# Patient Record
Sex: FEMALE | NOT HISPANIC OR LATINO | Employment: UNEMPLOYED | URBAN - METROPOLITAN AREA
[De-identification: names, ages, dates, MRNs, and addresses within clinical notes are randomized per-mention and may not be internally consistent; named-entity substitution may affect disease eponyms.]

---

## 2017-03-09 ENCOUNTER — OFFICE VISIT (OUTPATIENT)
Dept: NEUROLOGY | Facility: CLINIC | Age: 22
End: 2017-03-09

## 2017-03-09 VITALS — HEART RATE: 73 BPM | DIASTOLIC BLOOD PRESSURE: 75 MMHG | SYSTOLIC BLOOD PRESSURE: 112 MMHG | RESPIRATION RATE: 18 BRPM

## 2017-03-09 DIAGNOSIS — G43.009 MIGRAINE WITHOUT AURA AND WITHOUT STATUS MIGRAINOSUS, NOT INTRACTABLE: Primary | ICD-10-CM

## 2017-03-09 RX ORDER — TOPIRAMATE 25 MG/1
TABLET, FILM COATED ORAL
Qty: 90 TABLET | Refills: 1 | Status: SHIPPED | OUTPATIENT
Start: 2017-03-09 | End: 2017-04-13 | Stop reason: SINTOL

## 2017-03-09 ASSESSMENT — PAIN SCALES - GENERAL: PAINLEVEL: SEVERE PAIN (7)

## 2017-03-09 NOTE — MR AVS SNAPSHOT
After Visit Summary   3/9/2017    Jessica Dutta    MRN: [id number]           Patient Information     Date Of Birth          1995        Visit Information        Provider Department      3/9/2017 2:30 PM Mckay Arellano MD [facility]        Today's Diagnoses     Migraine without aura and without status migrainosus, not intractable    -  1       Follow-ups after your visit        Follow-up notes from your care team     Discussed this visit Return in about 5 weeks (around 2017).      Future tests that were ordered for you today     Open Future Orders        Priority Expected Expires Ordered    Basic metabolic panel Routine 2017 2017 3/9/2017            Who to contact     Please call your clinic at [phone] to:    Ask questions about your health    Make or cancel appointments    Discuss your medicines    Learn about your test results    Speak to your doctor   If you have compliments or concerns about an experience at your clinic, or if you wish to file a complaint, please contact [facility] Patient Relations at [phone] or email us at [email or web address]         Additional Information About Your Visit        MyChart Information     [employer] is an electronic gateway that provides easy, online access to your medical records. With GameLayers, you can request a clinic appointment, read your test results, renew a prescription or communicate with your care team.     To sign up for [employer] visit the website at [email or web address]   You will be asked to enter the access code listed below, as well as some personal information. Please follow the directions to create your username and password.     Your access code is: [email or web address]  Expires: 5/3/2017  8:22 AM     Your access code will  in 90 days. If you need help or a new code, please contact your [facility] or  call [phone] for assistance.        Care EveryWhere ID     This is your Care EveryWhere ID. This could be used by other organizations to access your [place] medical records  [id number]        Your Vitals Were     Pulse Respirations                73 18           Blood Pressure from Last 3 Encounters:   03/09/17 112/75   04/08/16 117/71    Weight from Last 3 Encounters:   04/08/16 210 lb (95.3 kg) (98 %)*     * Growth percentiles are based on [facility] 2-20 Years data.                 Today's Medication Changes          These changes are accurate as of: 3/9/17  3:14 PM.  If you have any questions, ask your nurse or doctor.               Start taking these medicines.        Dose/Directions    topiramate 25 MG tablet   Commonly known as:  TOPAMAX   Used for:  Migraine without aura and without status migrainosus, not intractable   Started by:  Mckay Arellano MD        One at night x 1 week, then 1 in am and 1 at night x 1 week, then 1 in am and 2 at night   Quantity:  90 tablet   Refills:  1            Where to get your medicines      These medications were sent to [place] [address] [place] [address] [place] [id number]     Phone:  [phone]     topiramate 25 MG tablet                Primary Care Provider Office Phone # Fax #    Castillo W Behrens [phone]       [address] [address]        Thank you!     Thank you for choosing [facility]  for your care. Our goal is always to provide you with excellent care. Hearing back from our patients is one way we can continue to improve our services. Please take a few minutes to complete the written survey that you may receive in the mail after your visit with us. Thank you!             Your Updated Medication List - Protect others around you: Learn how to safely use, store and throw away your medicines at www.disposemymeds.org.           This list is accurate as of: 3/9/17  3:14 PM.  Always use your most recent med list.                   Brand Name Dispense Instructions for use    ALAWAY CHILDRENS ALLERGY OP          Aluminum Chloride 12 % Soln          cycloSPORINE 0.05 % ophthalmic emulsion    RESTASIS     1 drop 2 times daily       dextromethorphan-guaiFENesin  MG per 12 hr tablet    MUCINEX DM     Take 1 tablet by mouth every 12 hours       levonorgestrel-ethinyl estradiol 0.15-0.03 MG per tablet    SEASONALE     Take 1 tablet by mouth daily       loteprednol 0.2 % Susp ophthalmic susp    LOTEMAX/ALREX     1 drop 4 times daily       magnesium citrate solution      Take 148 mLs by mouth once       NASACORT ALLERGY 24HR NA          OMEGA-3 FISH OIL PO          PREVACID PO          PROBIOTIC DAILY PO          RANITIDINE HCL PO          topiramate 25 MG tablet    TOPAMAX    90 tablet    One at night x 1 week, then 1 in am and 1 at night x 1 week, then 1 in am and 2 at night       VITAMIN D (CHOLECALCIFEROL) PO      Take by mouth daily       ZYRTEC ALLERGY PO

## 2017-03-09 NOTE — PROGRESS NOTES
2017      Ryan W. Behrens, MD   [facility]   [address] [place]   [address]      RE: Jessica Dutta   MRN: [id number]   : 1995      Dear Castillo:      I saw Jessica Dutta on 2017.  She is a 21-year-old female here for evaluation of headaches.      She began experiencing headaches around puberty.  As time has gone on, she has developed a daily headache pattern.  She has a less severe headache that she describes as a throbbing, aching pain and about twice a week a more severe intense pressure headache.  The more intense headaches are associated with photophobia, nausea and rarely vomiting.  Headaches can be posterior, temporal or frontal.  She denies any complex neurologic symptoms or neurologic aura prior to her headaches but with her more severe ones she will have a sense of feeling fatigued and run down before they developed.      She will take Aleve a few times a week for the headaches.      She believes she has been tried on either amitriptyline, nortriptyline, or both in the past.  She did not tolerate the anticholinergic side effects of these 2 drugs.  It produced dry eyes and dry mouth.      She did have a head CAT scan done on 2016 that I was able to review and it is a normal study.      Her past medical history is notable for reflux.  She also has a prior history of depression.  Other health problems include dysmenorrhea, anxiety, and irritable bowel syndrome.      CURRENT MEDICATIONS:   1.  Magnesium.   2.  Ranitidine.     3.  Prevacid.   4.  Fish oil.   5.  Vitamin D.   6.  Probiotic.     7.  Seasonale.     8.  Restasis eye drops.   9.  Zyrtec, which she indicates she is no longer taking.   10.  Lotemax suspension.      ALLERGIES:  She is allergic to ciprofloxacin, metronidazole, neomycin and Zofran.      Family history is notable for her mother having multiple sclerosis.  There is no family history of migraine that she or her mother is aware of.      She is  currently not working.  She does not smoke or use alcohol.      Examination reveals a quiet female.  Heart rate 73.  Blood pressure 112/75.  Funduscopic examination reveals sharp disc margins.  Pupils are equal, round and react well to light.  Visual fields are intact.  Cranial nerves II-XII are intact.  Motor, sensory, cerebellar and gait testing are normal.  Reflexes are 2-3+ and symmetric.  Plantar responses are flexor.      IMPRESSION:  Chronic headaches.      I believe her underlying problem is migraine.      PLAN:  I discussed the diagnosis with the patient and her mother.  She is having daily headaches of various severity.  I have suggested considering a preventive medication.      As noted above, she had side effects from amitriptyline and nortriptyline, mainly due to their anticholinergic properties.      I discussed a trial of Topamax.  I reviewed potential side effects.  She does not have any absolute contraindications to the drug.      She is going to start on Topamax 25 mg a day for a week, then she can go to 50 mg a day and then 75 mg depending on her response and tolerance.      I am going to have her followup with me in a month or so.  Prior to that visit, I have asked her to have a basic metabolic panel checked.      Sincerely,        MD BRITTNI Chambers MD             D: 2017 15:28   T: 2017 16:35   MT: AKA      Name:     PABLO NATARAJAN   MRN:      [id number]        Account:      [id number]   :      1995           Service Date: 2017      Document: [id number]

## 2017-03-09 NOTE — LETTER
3/9/2017       RE: Jessica Dutta  [address] [place] [address]     Dear Colleague,    Thank you for referring your patient, Jessica Dutta, to the [facility] at [facility]. Please see a copy of my visit note below.    2017      Ryan W. Behrens, MD   [facility]   [address] [place]   [address]      RE: Jessica Dutta   MRN: [id number]   : 1995      Dear Castillo:      I saw Jessica Dutta on 2017.  She is a 21-year-old female here for evaluation of headaches.      She began experiencing headaches around puberty.  As time has gone on, she has developed a daily headache pattern.  She has a less severe headache that she describes as a throbbing, aching pain and about twice a week a more severe intense pressure headache.  The more intense headaches are associated with photophobia, nausea and rarely vomiting.  Headaches can be posterior, temporal or frontal.  She denies any complex neurologic symptoms or neurologic aura prior to her headaches but with her more severe ones she will have a sense of feeling fatigued and run down before they developed.      She will take Aleve a few times a week for the headaches.      She believes she has been tried on either amitriptyline, nortriptyline, or both in the past.  She did not tolerate the anticholinergic side effects of these 2 drugs.  It produced dry eyes and dry mouth.      She did have a head CAT scan done on 2016 that I was able to review and it is a normal study.      Her past medical history is notable for reflux.  She also has a prior history of depression.  Other health problems include dysmenorrhea, anxiety, and irritable bowel syndrome.      CURRENT MEDICATIONS:   1.  Magnesium.   2.  Ranitidine.     3.  Prevacid.   4.  Fish oil.   5.  Vitamin D.   6.  Probiotic.     7.  Seasonale.     8.  Restasis eye drops.   9.  Zyrtec, which  she indicates she is no longer taking.   10.  Lotemax suspension.      ALLERGIES:  She is allergic to ciprofloxacin, metronidazole, neomycin and Zofran.      Family history is notable for her mother having multiple sclerosis.  There is no family history of migraine that she or her mother is aware of.      She is currently not working.  She does not smoke or use alcohol.      Examination reveals a quiet female.  Heart rate 73.  Blood pressure 112/75.  Funduscopic examination reveals sharp disc margins.  Pupils are equal, round and react well to light.  Visual fields are intact.  Cranial nerves II-XII are intact.  Motor, sensory, cerebellar and gait testing are normal.  Reflexes are 2-3+ and symmetric.  Plantar responses are flexor.      IMPRESSION:  Chronic headaches.      I believe her underlying problem is migraine.      PLAN:  I discussed the diagnosis with the patient and her mother.  She is having daily headaches of various severity.  I have suggested considering a preventive medication.      As noted above, she had side effects from amitriptyline and nortriptyline, mainly due to their anticholinergic properties.      I discussed a trial of Topamax.  I reviewed potential side effects.  She does not have any absolute contraindications to the drug.      She is going to start on Topamax 25 mg a day for a week, then she can go to 50 mg a day and then 75 mg depending on her response and tolerance.      I am going to have her followup with me in a month or so.  Prior to that visit, I have asked her to have a basic metabolic panel checked.      Sincerely,                BRITTNI WALDEN MD             D: 2017 15:28   T: 2017 16:35   MT: AKA      Name:     PABLO NATARAJAN   MRN:      [id number]        Account:      [id number]   :      1995           Service Date: 2017      Document: [id number]

## 2017-04-04 LAB
ANION GAP SERPL CALCULATED.3IONS-SCNC: 8 MMOL/L
BUN SERPL-MCNC: 8 MG/DL
CALCIUM SERPL-MCNC: 9.5 MG/DL
CHLORIDE SERPLBLD-SCNC: 107 MMOL/L
CO2 SERPL-SCNC: 24 MMOL/L
CREAT SERPL-MCNC: 0.74 MG/DL
GFR SERPL CREATININE-BSD FRML MDRD: NORMAL ML/MIN/1.73M2
GLUCOSE SERPL-MCNC: 92 MG/DL (ref 70–99)
POTASSIUM SERPL-SCNC: 3.9 MMOL/L
SODIUM SERPL-SCNC: 139 MMOL/L

## 2017-04-13 ENCOUNTER — OFFICE VISIT (OUTPATIENT)
Dept: NEUROLOGY | Facility: CLINIC | Age: 22
End: 2017-04-13

## 2017-04-13 VITALS — RESPIRATION RATE: 18 BRPM | SYSTOLIC BLOOD PRESSURE: 118 MMHG | HEART RATE: 86 BPM | DIASTOLIC BLOOD PRESSURE: 75 MMHG

## 2017-04-13 DIAGNOSIS — R51.9 CHRONIC DAILY HEADACHE: Primary | ICD-10-CM

## 2017-04-13 RX ORDER — GABAPENTIN 100 MG/1
CAPSULE ORAL
Qty: 180 CAPSULE | Refills: 3 | Status: SHIPPED | OUTPATIENT
Start: 2017-04-13 | End: 2019-05-15

## 2017-04-13 ASSESSMENT — PAIN SCALES - GENERAL: PAINLEVEL: SEVERE PAIN (6)

## 2017-04-13 NOTE — LETTER
2017       RE: Pablo Dutta  [address] [place] [address]     Dear Colleague,    Thank you for referring your patient, Pablo Dutta, to the [facility] Please see a copy of my visit note below.    2017      Ryan W. Behrens, MD   [facility]    [address] [place] [address]      RE: Pablo Dutta   MRN: [id number]   : 1995      Dear Castillo:      I saw Pablo Dutta back.  She is a patient I saw last month with chronic headaches.  She has been experiencing daily headaches.  She likely has underlying migraine.      She did try Topamax.  She developed intolerable paresthesias and stopped the drug.  She had previously tried amitriptyline and nortriptyline, but both of these produced intolerable anticholinergic side effects.      I discussed a couple of other treatment options for her.  I discussed propranolol, but she is concerned about the potential for it affecting her exercise tolerance.      We discussed gabapentin.  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  She was only on it for a short time and cannot recall if she definitely had any side effects from the drug.  I did review potential side effects including sedation, depression and peripheral edema.      She is going to start on gabapentin 100 mg twice a day for a week, then she can go to 100 mg 3 times a day for a week and then 200 mg 3 times a day.  If she finds the drug excessively sedating, she can take the full dose at bedtime.      I have asked her to call me in a month for an update.  If she is intolerant of gabapentin, she may be interested at that point in trying propranolol.      Sincerely,        BRITTNI WALDEN MD             D: 2017 14:39   T: 2017 21:27   MT: AKA      Name:     PABLO DUTTA   MRN:      [id number]        Account:      [id number]   :      1995            Service Date: 04/13/2017

## 2017-04-13 NOTE — NURSING NOTE
Chief Complaint   Patient presents with     RECHECK     5 wk f/u RX check labs     Darius Gould,CMA

## 2017-04-13 NOTE — MR AVS SNAPSHOT
After Visit Summary   2017    Jessica Dutta    MRN: [id number]           Patient Information     Date Of Birth          1995        Visit Information        Provider Department      2017 2:00 PM Mckay Arellano MD [facility]        Today's Diagnoses     Chronic daily headache    -  1      Care Instructions    Call Dr Arellano in mid May for an update  Call [phone]. Hit option #1        Follow-ups after your visit        Follow-up notes from your care team     Discussed this visit Return in about 6 months (around 10/13/2017).      Who to contact     Please call your clinic at [phone] to:    Ask questions about your health    Make or cancel appointments    Discuss your medicines    Learn about your test results    Speak to your doctor   If you have compliments or concerns about an experience at your clinic, or if you wish to file a complaint, please contact [facility] Patient Relations at [phone] or email us at [email or web address]         Additional Information About Your Visit        MyChart Information     [employer] is an electronic gateway that provides easy, online access to your medical records. With [employer]ovation, you can request a clinic appointment, read your test results, renew a prescription or communicate with your care team.     To sign up for [employer] visit the website at [email or web address]   You will be asked to enter the access code listed below, as well as some personal information. Please follow the directions to create your username and password.     Your access code is: [email or web address]  Expires: 5/3/2017  9:22 AM     Your access code will  in 90 days. If you need help or a new code, please contact your [facility] or call [phone] for assistance.        Care EveryWhere ID     This is your Care EveryWhere ID. This could be used by  other organizations to access your [place] medical records  [id number]        Your Vitals Were     Pulse Respirations                86 18           Blood Pressure from Last 3 Encounters:   04/13/17 118/75   03/09/17 112/75   04/08/16 117/71    Weight from Last 3 Encounters:   04/08/16 210 lb (95.3 kg) (98 %)*     * Growth percentiles are based on [facility] 2-20 Years data.              Today, you had the following     No orders found for display         Today's Medication Changes          These changes are accurate as of: 4/13/17  2:32 PM.  If you have any questions, ask your nurse or doctor.               Start taking these medicines.        Dose/Directions    gabapentin 100 MG capsule   Commonly known as:  NEURONTIN   Used for:  Chronic daily headache   Started by:  Mckay Arellano MD        1 twice a day x 1 week then 1CAP 3 times a day x 1 week then 2 CAPS 3 times a day   Quantity:  180 capsule   Refills:  3         Stop taking these medicines if you haven't already. Please contact your care team if you have questions.     topiramate 25 MG tablet   Commonly known as:  TOPAMAX   Stopped by:  Mckay Arellano MD                Where to get your medicines      These medications were sent to [place] [address] [place] [address] [address]     Phone:  [phone]     gabapentin 100 MG capsule                Primary Care Provider Office Phone # Fax #    Ryan W Behrens [phone]       [facility] [address] [facility] [address]        Thank you!     Thank you for choosing [facility]  for your care. Our goal is always to provide you with excellent care. Hearing back from our patients is one way we can continue to improve our services. Please take a few minutes to complete the written survey that you may receive in the mail after your visit with us. Thank you!             Your Updated  Medication List - Protect others around you: Learn how to safely use, store and throw away your medicines at www.disposemymeds.org.          This list is accurate as of: 4/13/17  2:32 PM.  Always use your most recent med list.                   Brand Name Dispense Instructions for use    ALAWAY CHILDRENS ALLERGY OP          Aluminum Chloride 12 % Soln          CIMETIDINE PO      Take 200 mg by mouth once       cycloSPORINE 0.05 % ophthalmic emulsion    RESTASIS     1 drop 2 times daily       dextromethorphan-guaiFENesin  MG per 12 hr tablet    MUCINEX DM     Take 1 tablet by mouth every 12 hours       gabapentin 100 MG capsule    NEURONTIN    180 capsule    1 twice a day x 1 week then 1CAP 3 times a day x 1 week then 2 CAPS 3 times a day       levonorgestrel-ethinyl estradiol 0.15-0.03 MG per tablet    SEASONALE     Take 1 tablet by mouth daily       loteprednol 0.2 % Susp ophthalmic susp    LOTEMAX/ALREX     1 drop 4 times daily       magnesium citrate solution      Take 148 mLs by mouth once       NASACORT ALLERGY 24HR NA          OMEGA-3 FISH OIL PO          PREVACID PO          PROBIOTIC DAILY PO          RANITIDINE HCL PO          VITAMIN D (CHOLECALCIFEROL) PO      Take by mouth daily       ZYRTEC ALLERGY PO

## 2017-04-14 NOTE — PROGRESS NOTES
2017      Ryan W. Behrens, MD   [facility]    [address] [place] [address]      RE: Pablo Dutta   MRN: [id number]   : 1995      Dear Castillo:      I saw Pablo Dutta back.  She is a patient I saw last month with chronic headaches.  She has been experiencing daily headaches.  She likely has underlying migraine.      She did try Topamax.  She developed intolerable paresthesias and stopped the drug.  She had previously tried amitriptyline and nortriptyline, but both of these produced intolerable anticholinergic side effects.      I discussed a couple of other treatment options for her.  I discussed propranolol, but she is concerned about the potential for it affecting her exercise tolerance.      We discussed gabapentin.  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  She was only on it for a short time and cannot recall if she definitely had any side effects from the drug.  I did review potential side effects including sedation, depression and peripheral edema.      She is going to start on gabapentin 100 mg twice a day for a week, then she can go to 100 mg 3 times a day for a week and then 200 mg 3 times a day.  If she finds the drug excessively sedating, she can take the full dose at bedtime.      I have asked her to call me in a month for an update.  If she is intolerant of gabapentin, she may be interested at that point in trying propranolol.      Sincerely,      MD BRITTNI Chambers MD             D: 2017 14:39   T: 2017 21:27   MT: AKA      Name:     PABLO DUTTA   MRN:      [id number]        Account:      [id number]   :      1995           Service Date: 2017      Document: [id number]

## 2017-04-19 DIAGNOSIS — G43.009 MIGRAINE WITHOUT AURA AND WITHOUT STATUS MIGRAINOSUS, NOT INTRACTABLE: ICD-10-CM

## 2017-11-30 ENCOUNTER — TELEPHONE (OUTPATIENT)
Dept: GASTROENTEROLOGY | Facility: CLINIC | Age: 22
End: 2017-11-30

## 2017-11-30 NOTE — TELEPHONE ENCOUNTER
182 pages of medical records received from [facility] with copy of RAMBO signed by patient requesting that these be sent to Dr. Norris.  There are no noted C Diff diagnoses in records.  Call place to patient to ask her why these were sent to Dr. Norris.  Message left for patient to return my call giving my direct call back number.      Carol Westholter

## 2017-12-13 NOTE — TELEPHONE ENCOUNTER
No call back from patient.  Closing encounter at this time.  Will hold on to records for now.     Carol Westholter

## 2019-05-15 ENCOUNTER — OFFICE VISIT (OUTPATIENT)
Dept: OBGYN | Facility: CLINIC | Age: 24
End: 2019-05-15
Attending: OBSTETRICS & GYNECOLOGY
Payer: COMMERCIAL

## 2019-05-15 VITALS
SYSTOLIC BLOOD PRESSURE: 127 MMHG | HEART RATE: 86 BPM | HEIGHT: 66 IN | DIASTOLIC BLOOD PRESSURE: 81 MMHG | BODY MASS INDEX: 30.82 KG/M2 | WEIGHT: 191.8 LBS

## 2019-05-15 DIAGNOSIS — N94.819 VULVODYNIA: ICD-10-CM

## 2019-05-15 DIAGNOSIS — N94.810 VESTIBULITIS, VULVAR: Primary | ICD-10-CM

## 2019-05-15 PROCEDURE — G0463 HOSPITAL OUTPT CLINIC VISIT: HCPCS | Mod: ZF

## 2019-05-15 RX ORDER — DOXEPIN HYDROCHLORIDE 50 MG/G
CREAM TOPICAL
Qty: 45 G | Refills: 3 | Status: SHIPPED | OUTPATIENT
Start: 2019-05-15

## 2019-05-15 RX ORDER — INDAPAMIDE 1.25 MG
TABLET ORAL
Refills: 11 | COMMUNITY
Start: 2018-09-18

## 2019-05-15 RX ORDER — BETAMETHASONE DIPROPIONATE 0.5 MG/G
CREAM TOPICAL 2 TIMES DAILY
Refills: 6 | COMMUNITY
Start: 2018-11-20 | End: 2019-05-16

## 2019-05-15 RX ORDER — CLINDAMYCIN PHOSPHATE 10 UG/ML
LOTION TOPICAL
COMMUNITY
Start: 2018-09-14

## 2019-05-15 RX ORDER — DOXEPIN HYDROCHLORIDE 50 MG/G
CREAM TOPICAL
Refills: 5 | COMMUNITY
Start: 2018-11-20

## 2019-05-15 RX ORDER — NAPROXEN 500 MG/1
TABLET ORAL
COMMUNITY
Start: 2015-11-27

## 2019-05-15 RX ORDER — FLUOCINOLONE ACETONIDE 0.11 MG/ML
OIL AURICULAR (OTIC)
Refills: 6 | COMMUNITY
Start: 2019-03-19

## 2019-05-15 RX ORDER — SULFACETAMIDE SODIUM 100 MG/ML
LOTION TOPICAL
Refills: 2 | COMMUNITY
Start: 2019-05-09 | End: 2024-04-26

## 2019-05-15 ASSESSMENT — ANXIETY QUESTIONNAIRES
GAD7 TOTAL SCORE: 5
3. WORRYING TOO MUCH ABOUT DIFFERENT THINGS: SEVERAL DAYS
7. FEELING AFRAID AS IF SOMETHING AWFUL MIGHT HAPPEN: NOT AT ALL
1. FEELING NERVOUS, ANXIOUS, OR ON EDGE: SEVERAL DAYS
2. NOT BEING ABLE TO STOP OR CONTROL WORRYING: NOT AT ALL
5. BEING SO RESTLESS THAT IT IS HARD TO SIT STILL: NOT AT ALL
6. BECOMING EASILY ANNOYED OR IRRITABLE: MORE THAN HALF THE DAYS

## 2019-05-15 ASSESSMENT — MIFFLIN-ST. JEOR: SCORE: 1638.81

## 2019-05-15 ASSESSMENT — PATIENT HEALTH QUESTIONNAIRE - PHQ9
SUM OF ALL RESPONSES TO PHQ QUESTIONS 1-9: 5
5. POOR APPETITE OR OVEREATING: SEVERAL DAYS

## 2019-05-15 NOTE — PROGRESS NOTES
Progress Note    SUBJECTIVE:  Jessica Dutta is an 23 year old, , who requests opinion on chronic vulvodynia/vulvar vestibulitis. Reports that at about age 16yo, started having recurrent sinus infections treated with antibiotics, and also subsequent vaginal yeast infections after abx therapy. At around this time, started having chronic vulvar pain and irritation. Mostly burning sensation, has multiple times been tested for BV/yeast and negative.     Previous therapies:  Pelvic floor PT  Oral and topical gabapentin  Oral antidepressants  Topical steroids  Lidocaine gel (2%), which does help but only for immediate relief  Most recently doxepin topically 1-2x/day which also helped, but insurance would not cover    She states that her current OBGYN sent her here to see if topical doxepin would be covered if she was able to see someone more specialized at the [place] [facility] to recommend it, or if there were any other therapies indicated.    She does not recall ever trying topical estrogen or estrogen/testosterone compound topically.     Never sexually active, has never had pap smear before due to difficulty tolerating exams.    Takes COCs due to heavy, crampy cycles, also takes Naproxen which helps with cycle control and pain.     There is a somewhat odd family dynamic, she is accompanied today by her mother and brother (who is about her age, maybe a little bit older). Both did leave room for the exam, and patient denied any history of abuse (physical, emotional or sexual) while she was alone with provider.       MEDICAL HISTORY:    Current Outpatient Medications on File Prior to Visit:  Aluminum Chloride 12 % SOLN    botulinum toxin type A (BOTOX) 100 units injection    Cetirizine HCl (ZYRTEC ALLERGY PO)    clindamycin (CLEOCIN T) 1 % external lotion APPLY THIN LAYER TO FACE, CHEST, BACK,NECK, AND FLANK ONCE DAILY IN THE MORNING   cycloSPORINE (RESTASIS) 0.05 % ophthalmic emulsion 1 drop 2 times daily    dextromethorphan-guaiFENesin (MUCINEX DM)  MG per 12 hr tablet Take 1 tablet by mouth every 12 hours   Lansoprazole (PREVACID PO)    levonorgestrel-ethinyl estradiol (SEASONALE) 0.15-0.03 MG per tablet Take 1 tablet by mouth daily   loteprednol (LOTEMAX/ALREX) 0.2 % SUSP 1 drop 4 times daily   magnesium hydroxide (MILK OF MAGNESIA) 400 MG/5ML suspension    naproxen (NAPROSYN) 500 MG tablet    olopatadine (PAZEO) 0.7 % ophthalmic solution    Omega-3 Fatty Acids (OMEGA-3 FISH OIL PO)    Probiotic Product (PROBIOTIC DAILY PO)    RANITIDINE HCL PO    Triamcinolone Acetonide (NASACORT ALLERGY 24HR NA)    VITAMIN D, CHOLECALCIFEROL, PO Take by mouth daily   doxepin (ZONALON) 5 % external cream APPLY TOPICALLY FOUR TIMES DAILY   FINACEA 15 % external gel APPLY THIN LAYER TO AFFECTED AREA ON FACE, NECK,CHEST, BACK, FLANKS, THIGHS AND BUTTOCKS AT NIGHT   fluocinolone acetonide 0.01 % OIL PLACE 5 DROPS INTO BOTH EARS 2 TIMES DAILY. AS NEEDED   lidocaine (XYLOCAINE) 2 % external gel USE TOPICALLY AS NEEDED   sulfacetamide sodium, Acne, 10 % lotion APPLY TO FACE, NECK, CHEST TWICE DAILY     No current facility-administered medications on file prior to visit.   Allergies   Allergen Reactions     Ciprofloxacin Muscle Pain (Myalgia)     Metronidazole Muscle Pain (Myalgia)     Neomycin Rash     Zofran [Ondansetron] Rash       OB History    Para Term  AB Living   0 0 0 0 0 0   SAB TAB Ectopic Multiple Live Births   0 0 0 0 0     Past Medical History:   Diagnosis Date     Birth control      GERD (gastroesophageal reflux disease)      Seasonal allergies      Past Surgical History:   Procedure Laterality Date     ESOPHAGOSCOPY, GASTROSCOPY, DUODENOSCOPY (EGD), COMBINED N/A 2016    Procedure: COMBINED ESOPHAGOSCOPY, GASTROSCOPY, DUODENOSCOPY (EGD), REMOVE FOREIGN BODY;  Surgeon: Jessica Martinez MD;  Location:  GI     No family history on file.  Social History     Socioeconomic History     Marital  "status: Single     Spouse name: None   Tobacco Use     Smoking status: Never Smoker     Smokeless tobacco: Never Used   Substance and Sexual Activity     Alcohol use: No     Drug use: None     Sexual activity: Never     Birth control/protection: Pill     Intimate partner violence:        ROS: 10 point ROS neg other than the symptoms noted above in the HPI.    PHYSICAL EXAM:  Blood pressure 127/81, pulse 86, height 1.664 m (5' 5.5\"), weight 87 kg (191 lb 12.8 oz), not currently breastfeeding. Body mass index is 31.43 kg/m .  General - Well-nourished, pleasant female in no acute distress  Psych - somewhat flat affect, otherwise appropriate  Neurological - grossly normal strength. Normal mental status    Pelvic Exam:  EG/BUS: Normal genital architecture without lesions, erythema or abnormal secretions Bartholin's, Urethra, [place]'s appear normal   Positive q-tip test 5-7 o'clock of introitus, moderately TTP consistent with vulvar vestibulitis  Internal exam/bimanual exam not done due to patient discomfort      ASSESSMENT:  Encounter Diagnoses   Name Primary?     Vestibulitis, vulvar Yes     Vulvodynia         PLAN:   Long discussion with patient and her family regarding difficulty in treatment and many possible therapies for vulvar vestibulitis and vulvodynia. Reviewed recommended hygiene practices which she is very aware of and currently following. Also reviewed that first-line therapy is usually hormonal topical treatments (estrogen/testosterone cream or estrogen alone). However, since she has had good success with the doxepin, I think it is reasonable to continue to try to get this pre-authorized. Should this fail, could potentially try hormonal topical therapy, but this may likely not be covered well by insurance, either. She will keep in contact with me regarding coverage and if she would like to try hormonal topical therapy instead.     Also discussed recommendations for using dilator kit (which she has) and " resuming PT once her vulvar vestibulitis is better controlled, so that her vulvodynia can also be addressed with multi-modal therapy and also to be able to perform recommended pap screening which she is due for.    Will plan for 3 month f/u to see how therapy is going if we are able to get back on the doxepin regularly, or if we need to change to hormonal therapy, and will see if can tolerate pelvic exam at that time as well. Given clinic number to call if further issues with insurance and desires to try estrogen/testosterone therapy.    Marie Estrada MD  5/15/19

## 2019-05-15 NOTE — LETTER
5/15/2019       RE: Jessica Dutta  [address] [place] [address]     Dear Colleague,    Thank you for referring your patient, Jessica Dutta, to the [facility] at [facility]. Please see a copy of my visit note below.    Progress Note    SUBJECTIVE:  Jessica Dutta is an 23 year old, , who requests opinion on chronic vulvodynia/vulvar vestibulitis. Reports that at about age 18yo, started having recurrent sinus infections treated with antibiotics, and also subsequent vaginal yeast infections after abx therapy. At around this time, started having chronic vulvar pain and irritation. Mostly burning sensation, has multiple times been tested for BV/yeast and negative.     Previous therapies:  Pelvic floor PT  Oral and topical gabapentin  Oral antidepressants  Topical steroids  Lidocaine gel (2%), which does help but only for immediate relief  Most recently doxepin topically 1-2x/day which also helped, but insurance would not cover    She states that her current OBGYN sent her here to see if topical doxepin would be covered if she was able to see someone more specialized at the [facility] to recommend it, or if there were any other therapies indicated.    She does not recall ever trying topical estrogen or estrogen/testosterone compound topically.     Never sexually active, has never had pap smear before due to difficulty tolerating exams.    Takes COCs due to heavy, crampy cycles, also takes Naproxen which helps with cycle control and pain.     There is a somewhat odd family dynamic, she is accompanied today by her mother and brother (who is about her age, maybe a little bit older). Both did leave room for the exam, and patient denied any history of abuse (physical, emotional or sexual) while she was alone with provider.       MEDICAL HISTORY:    Current Outpatient Medications on File Prior to Visit:  Aluminum Chloride 12 % SOLN    botulinum toxin type  A (BOTOX) 100 units injection    Cetirizine HCl (ZYRTEC ALLERGY PO)    clindamycin (CLEOCIN T) 1 % external lotion APPLY THIN LAYER TO FACE, CHEST, BACK,NECK, AND FLANK ONCE DAILY IN THE MORNING   cycloSPORINE (RESTASIS) 0.05 % ophthalmic emulsion 1 drop 2 times daily   dextromethorphan-guaiFENesin (MUCINEX DM)  MG per 12 hr tablet Take 1 tablet by mouth every 12 hours   Lansoprazole (PREVACID PO)    levonorgestrel-ethinyl estradiol (SEASONALE) 0.15-0.03 MG per tablet Take 1 tablet by mouth daily   loteprednol (LOTEMAX/ALREX) 0.2 % SUSP 1 drop 4 times daily   magnesium hydroxide (MILK OF MAGNESIA) 400 MG/5ML suspension    naproxen (NAPROSYN) 500 MG tablet    olopatadine (PAZEO) 0.7 % ophthalmic solution    Omega-3 Fatty Acids (OMEGA-3 FISH OIL PO)    Probiotic Product (PROBIOTIC DAILY PO)    RANITIDINE HCL PO    Triamcinolone Acetonide (NASACORT ALLERGY 24HR NA)    VITAMIN D, CHOLECALCIFEROL, PO Take by mouth daily   doxepin (ZONALON) 5 % external cream APPLY TOPICALLY FOUR TIMES DAILY   FINACEA 15 % external gel APPLY THIN LAYER TO AFFECTED AREA ON FACE, NECK,CHEST, BACK, FLANKS, THIGHS AND BUTTOCKS AT NIGHT   fluocinolone acetonide 0.01 % OIL PLACE 5 DROPS INTO BOTH EARS 2 TIMES DAILY. AS NEEDED   lidocaine (XYLOCAINE) 2 % external gel USE TOPICALLY AS NEEDED   sulfacetamide sodium, Acne, 10 % lotion APPLY TO FACE, NECK, CHEST TWICE DAILY     No current facility-administered medications on file prior to visit.   Allergies   Allergen Reactions     Ciprofloxacin Muscle Pain (Myalgia)     Metronidazole Muscle Pain (Myalgia)     Neomycin Rash     Zofran [Ondansetron] Rash       OB History    Para Term  AB Living   0 0 0 0 0 0   SAB TAB Ectopic Multiple Live Births   0 0 0 0 0     Past Medical History:   Diagnosis Date     Birth control      GERD (gastroesophageal reflux disease)      Seasonal allergies      Past Surgical History:   Procedure Laterality Date     ESOPHAGOSCOPY, GASTROSCOPY,  "DUODENOSCOPY (EGD), COMBINED N/A 4/8/2016    Procedure: COMBINED ESOPHAGOSCOPY, GASTROSCOPY, DUODENOSCOPY (EGD), REMOVE FOREIGN BODY;  Surgeon: Jessica Martinez MD;  Location: [facility]     No family history on file.  Social History     Socioeconomic History     Marital status: Single     Spouse name: None   Tobacco Use     Smoking status: Never Smoker     Smokeless tobacco: Never Used   Substance and Sexual Activity     Alcohol use: No     Drug use: None     Sexual activity: Never     Birth control/protection: Pill     Intimate partner violence:        ROS: 10 point ROS neg other than the symptoms noted above in the HPI.    PHYSICAL EXAM:  Blood pressure 127/81, pulse 86, height 1.664 m (5' 5.5\"), weight 87 kg (191 lb 12.8 oz), not currently breastfeeding. Body mass index is 31.43 kg/m .  General - Well-nourished, pleasant female in no acute distress  Psych - somewhat flat affect, otherwise appropriate  Neurological - grossly normal strength. Normal mental status    Pelvic Exam:  EG/BUS: Normal genital architecture without lesions, erythema or abnormal secretions Bartholin's, Urethra, [place]'s appear normal   Positive q-tip test 5-7 o'clock of introitus, moderately TTP consistent with vulvar vestibulitis  Internal exam/bimanual exam not done due to patient discomfort      ASSESSMENT:  Encounter Diagnoses   Name Primary?     Vestibulitis, vulvar Yes     Vulvodynia         PLAN:   Long discussion with patient and her family regarding difficulty in treatment and many possible therapies for vulvar vestibulitis and vulvodynia. Reviewed recommended hygiene practices which she is very aware of and currently following. Also reviewed that first-line therapy is usually hormonal topical treatments (estrogen/testosterone cream or estrogen alone). However, since she has had good success with the doxepin, I think it is reasonable to continue to try to get this pre-authorized. Should this fail, could potentially try hormonal " topical therapy, but this may likely not be covered well by insurance, either. She will keep in contact with me regarding coverage and if she would like to try hormonal topical therapy instead.     Also discussed recommendations for using dilator kit (which she has) and resuming PT once her vulvar vestibulitis is better controlled, so that her vulvodynia can also be addressed with multi-modal therapy and also to be able to perform recommended pap screening which she is due for.    Will plan for 3 month f/u to see how therapy is going if we are able to get back on the doxepin regularly, or if we need to change to hormonal therapy, and will see if can tolerate pelvic exam at that time as well. Given clinic number to call if further issues with insurance and desires to try estrogen/testosterone therapy.    Marie Estrada MD  5/15/19

## 2019-05-15 NOTE — PATIENT INSTRUCTIONS
Call our clinic at [phone] if insurance declines prior authorization for doxepin.     Next steps will be estrogen/testosterone cream or estrogen cream alone (topically) daily at bedtime.    Recommend follow-up appointment in 3 months to reassess symptoms, have exam and possible pap smear if tolerated.

## 2019-05-15 NOTE — NURSING NOTE
Chief Complaint   Patient presents with     Consult     Vaginal pain. Insurance is not paying for current meds that was working.       See JAZMÍN Dawkins 5/15/2019

## 2019-05-16 ASSESSMENT — ANXIETY QUESTIONNAIRES: GAD7 TOTAL SCORE: 5

## 2019-05-17 ENCOUNTER — TELEPHONE (OUTPATIENT)
Dept: OBGYN | Facility: CLINIC | Age: 24
End: 2019-05-17

## 2019-05-17 NOTE — LETTER
May 21, 2019    Jessica Dutta  [address] [place] [address]      To Whom It May Concern:      Recently I prescribed doxepin (ZONALON) 5 % external cream for my patient Jessica Dutta to use as long term treatment for Vestibulitis, vulvar [N94.810] and Vulvodynia [N94.819]. This medication's prior authorization was denied. This patient has tried all the alternatives for this product including topical lidocaine, topical and oral gabapentin, oral antidepressants, topical steroids, and pelvic floor physical therapy. The only medication that has been working is the doxepin cream. As is well known in the gynecological community, vulvar vestibulitis and vulvodynia can cause long term psychological, physical, and emotional consequences if not treated. This medication is medically necessary to treat these conditions before they become irreversible. Please reconsider covering this medication for the long term use for Jessica.       Sincerely,        Dr. Marie Estrada

## 2019-05-17 NOTE — TELEPHONE ENCOUNTER
Central Prior Authorization Team   Phone: [phone]      PA Initiation    Medication: doxepin (ZONALON) 5 % external cream-PA Pending  Insurance Company: [employer] - Phone [phone] Fax [phone]  Pharmacy Filling the Rx: [id number] [place] [facility] - [address]  Filling Pharmacy Phone: [phone]  Filling Pharmacy Fax: [phone]  Start Date: 5/17/2019

## 2019-05-17 NOTE — TELEPHONE ENCOUNTER
Prior Authorization Retail Medication Request    Medication/Dose: doxepin (ZONALON) 5 % external cream  ICD code (if different than what is on RX):    Vulvodynia [N94.819]   Vestibulitis, vulvar [N94.810]    Previously Tried and Failed:  Topical lidocaine, Topical and oral gabapentin, oral antidepressant, topical steroids, pelvic floor physical therapy.   Rationale:  This is the only medication that has been helping with symptoms!    Insurance Name:  [employer]  Insurance ID:        Pharmacy Information (if different than what is on RX)  Name:  [facility]  Phone:  [phone]

## 2019-05-20 NOTE — TELEPHONE ENCOUNTER
PRIOR AUTHORIZATION DENIED    Medication: doxepin (ZONALON) 5 % external cream-DENIED    Denial Date: 5/17/2019    Denial Rational:           Appeal Information:

## 2019-05-21 NOTE — TELEPHONE ENCOUNTER
Medication Appeal Initiation    We have initiated an appeal for the requested medication:  Medication: doxepin (ZONALON) 5 % external cream-APPEAL Pending  Appeal Start Date:  5/21/2019  Insurance Company: [employer] - Phone [phone] Fax [phone]  Comments:  Faxed appeal letter along with denial letter to Prescription Claim Appeals [phone]. Call [phone] for status check.

## 2019-05-22 NOTE — TELEPHONE ENCOUNTER
Junior Padilla (independent reviewer) from [employer] called to get clarification if patient will be using medication ongoing daily. Gave him the direction patient is using the medication and that there are refills on it, so yes, patient will be using medication for more than 8 days that insurance will only cover. He will continue with review.

## 2019-05-22 NOTE — TELEPHONE ENCOUNTER
The appeals department called with additional questions in regards to the appeal request; call transferred to PA rakesh Bautista.

## 2019-05-23 ENCOUNTER — TELEPHONE (OUTPATIENT)
Dept: OBGYN | Facility: CLINIC | Age: 24
End: 2019-05-23

## 2019-05-23 DIAGNOSIS — N94.819 VULVODYNIA: ICD-10-CM

## 2019-05-23 DIAGNOSIS — N94.810 VESTIBULITIS, VULVAR: Primary | ICD-10-CM

## 2019-05-23 NOTE — TELEPHONE ENCOUNTER
MEDICATION APPEAL DENIED    Medication: doxepin (ZONALON) 5 % external cream-APPEAL DENIED    Denial Date: 5/22/2019    Denial Rational:           Second Level Appeal Information: See Above (DENIED)

## 2019-05-23 NOTE — TELEPHONE ENCOUNTER
Discussed denial of doxepin cream with Dr. Estrada. OOP cost for doxepin cream at [facility] was over $600. She suggests switching to a compounded estradiol and testosterone cream, to see if this would be less expensive. Pharmacy is going to run it through their system and call back with price and then will call patient.

## 2019-05-23 NOTE — TELEPHONE ENCOUNTER
Discussed with Dr. Estrada, she is going to look into compounded options. Left message for patient that PA and appeal were denied and that we are working on alternatives. Will call patient when we have it worked out.

## 2019-05-24 ENCOUNTER — TELEPHONE (OUTPATIENT)
Dept: OBGYN | Facility: CLINIC | Age: 24
End: 2019-05-24

## 2019-05-24 DIAGNOSIS — N94.810 VESTIBULITIS, VULVAR: Primary | ICD-10-CM

## 2019-05-24 DIAGNOSIS — N94.819 VULVODYNIA: ICD-10-CM

## 2019-05-24 NOTE — TELEPHONE ENCOUNTER
See note.   Spoke with Jessica. She is willing to try compounded medication but wishes to only do estrogen at this time. This is ok per Sean. Rx for compounded estradiol sent to  compounding pharmacy.

## 2019-05-31 ENCOUNTER — TELEPHONE (OUTPATIENT)
Dept: OBGYN | Facility: CLINIC | Age: 24
End: 2019-05-31

## 2019-05-31 NOTE — TELEPHONE ENCOUNTER
Prior Authorization Specialty Medication Request    Medication/Dose:   ICD code (if different than what is on RX):     Previously Tried and Failed:   Important Lab Values:  Rationale:    Insurance Name:   Insurance ID:  Insurance Phone Number:   Pharmacy Information (if different than what is on RX)  Name:  [facility]  Phone:  [phone]

## 2020-03-10 ENCOUNTER — HEALTH MAINTENANCE LETTER (OUTPATIENT)
Age: 25
End: 2020-03-10

## 2020-12-27 ENCOUNTER — HEALTH MAINTENANCE LETTER (OUTPATIENT)
Age: 25
End: 2020-12-27

## 2021-04-24 ENCOUNTER — HEALTH MAINTENANCE LETTER (OUTPATIENT)
Age: 26
End: 2021-04-24

## 2021-10-09 ENCOUNTER — HEALTH MAINTENANCE LETTER (OUTPATIENT)
Age: 26
End: 2021-10-09

## 2022-03-24 ENCOUNTER — TRANSFERRED RECORDS (OUTPATIENT)
Dept: HEALTH INFORMATION MANAGEMENT | Facility: CLINIC | Age: 27
End: 2022-03-24

## 2022-05-16 ENCOUNTER — HEALTH MAINTENANCE LETTER (OUTPATIENT)
Age: 27
End: 2022-05-16

## 2022-09-11 ENCOUNTER — HEALTH MAINTENANCE LETTER (OUTPATIENT)
Age: 27
End: 2022-09-11

## 2023-06-03 ENCOUNTER — HEALTH MAINTENANCE LETTER (OUTPATIENT)
Age: 28
End: 2023-06-03

## 2023-06-15 ENCOUNTER — TRANSFERRED RECORDS (OUTPATIENT)
Dept: HEALTH INFORMATION MANAGEMENT | Facility: CLINIC | Age: 28
End: 2023-06-15

## 2024-04-26 ENCOUNTER — OFFICE VISIT (OUTPATIENT)
Dept: DERMATOLOGY | Facility: CLINIC | Age: 29
End: 2024-04-26
Payer: COMMERCIAL

## 2024-04-26 DIAGNOSIS — L74.519 FOCAL HYPERHIDROSIS: ICD-10-CM

## 2024-04-26 DIAGNOSIS — L81.0 POST-INFLAMMATORY HYPERPIGMENTATION: ICD-10-CM

## 2024-04-26 DIAGNOSIS — L30.9 DERMATITIS: ICD-10-CM

## 2024-04-26 DIAGNOSIS — L30.4 INTERTRIGO: ICD-10-CM

## 2024-04-26 DIAGNOSIS — L70.0 ACNE VULGARIS: Primary | ICD-10-CM

## 2024-04-26 DIAGNOSIS — B35.3 TINEA PEDIS OF BOTH FEET: ICD-10-CM

## 2024-04-26 PROCEDURE — 99204 OFFICE O/P NEW MOD 45 MIN: CPT | Performed by: PHYSICIAN ASSISTANT

## 2024-04-26 RX ORDER — TRIAMCINOLONE ACETONIDE 1 MG/G
CREAM TOPICAL
Qty: 80 G | Refills: 2 | Status: SHIPPED | OUTPATIENT
Start: 2024-04-26

## 2024-04-26 RX ORDER — SULFACETAMIDE SODIUM 100 MG/ML
LOTION TOPICAL
Qty: 118 ML | Refills: 11 | Status: SHIPPED | OUTPATIENT
Start: 2024-04-26

## 2024-04-26 RX ORDER — DESONIDE 0.5 MG/G
CREAM TOPICAL
Qty: 60 G | Refills: 1 | Status: SHIPPED | OUTPATIENT
Start: 2024-04-26

## 2024-04-26 RX ORDER — KETOCONAZOLE 20 MG/G
CREAM TOPICAL
Qty: 60 G | Refills: 5 | Status: SHIPPED | OUTPATIENT
Start: 2024-04-26 | End: 2024-09-19

## 2024-04-26 ASSESSMENT — PAIN SCALES - GENERAL: PAINLEVEL: NO PAIN (0)

## 2024-04-26 NOTE — LETTER
4/26/2024         RE: Jessica Dutta  [address] [place] [address]        Dear Colleague,    Thank you for referring your patient, Jesisca Dutta, to the [facility]. Please see a copy of my visit note below.    HPI:   Chief complaints: Jessica Dutta is a pleasant 28 year old female who presents for evaluation of hyperhidrosis. She has had this since adolescence. In the past she has tried drysol, glycopyrrolate, and Botox. Drysol and glycopyrrolate did not help but Botox has been quite helpful. She usually will get 100u in each armpit.   Additionally she would like refills on her sulfa lotion and azelaic acid for her acne. Both work well but she would like something to help with discoloration after she has a pimple.   Lastly she gets intermittent dermatitis in her hands and feet and face, well controlled between desonide for her face, ketoconazole and TAC. She would like refills of these.     PHYSICAL EXAM:    There were no vitals taken for this visit.  Skin exam performed as follows: Type 3 skin. Mood appropriate  Alert and Oriented X 3. Well developed, well nourished in no distress.  General appearance: Normal  Head including face: Normal  Eyes: conjunctiva and lids: Normal  Mouth: Lips, teeth, gums: Normal  Neck: Normal  Skin: Scalp and body hair: See below    Skin clear    ASSESSMENT/PLAN:     Hyperhidrosis - she will schedule for Botox with Dr Callejas. PA inputted. I discussed that she may only be covered for 100u total due to insurance but that she can call her insurance to see if they will cover 200u.   Acne vulgaris, PIH  --Start HQ/tretinoin/desoximetasone compound at bedtime to any hyperpigmented areas x 1-2 months then PRN  --Continue sulfa lotion BID  --Continue azelaic acid  3. Dermatitis on the face, hands and feet  --Continue desonide for face sparingly as needed  --TAC/Ketoconazole BID x 1-2 weeks PRN flares on the hands and feet          Follow-up:  yearly/Botox  CC:   Scribed By: Madalyn Cárdenas, MS, PASymoneC      Again, thank you for allowing me to participate in the care of your patient.        Sincerely,        Madalyn Cárdenas PA-C

## 2024-04-26 NOTE — PROGRESS NOTES
HPI:   Chief complaints: Jessica Dutta is a pleasant 28 year old female who presents for evaluation of hyperhidrosis. She has had this since adolescence. In the past she has tried drysol, glycopyrrolate, and Botox. Drysol and glycopyrrolate did not help but Botox has been quite helpful. She usually will get 100u in each armpit.   Additionally she would like refills on her sulfa lotion and azelaic acid for her acne. Both work well but she would like something to help with discoloration after she has a pimple.   Lastly she gets intermittent dermatitis in her hands and feet and face, well controlled between desonide for her face, ketoconazole and TAC. She would like refills of these.     PHYSICAL EXAM:    There were no vitals taken for this visit.  Skin exam performed as follows: Type 3 skin. Mood appropriate  Alert and Oriented X 3. Well developed, well nourished in no distress.  General appearance: Normal  Head including face: Normal  Eyes: conjunctiva and lids: Normal  Mouth: Lips, teeth, gums: Normal  Neck: Normal  Skin: Scalp and body hair: See below    Skin clear    ASSESSMENT/PLAN:     Hyperhidrosis - she will schedule for Botox with Dr Callejas. PA inputted. I discussed that she may only be covered for 100u total due to insurance but that she can call her insurance to see if they will cover 200u.   Acne vulgaris, PIH  --Start HQ/tretinoin/desoximetasone compound at bedtime to any hyperpigmented areas x 1-2 months then PRN  --Continue sulfa lotion BID  --Continue azelaic acid  3. Dermatitis on the face, hands and feet  --Continue desonide for face sparingly as needed  --TAC/Ketoconazole BID x 1-2 weeks PRN flares on the hands and feet          Follow-up: yearly/Botox  CC:   Scribed By: Madalyn Cárdenas, MS, PAPADMINI

## 2024-04-26 NOTE — PATIENT INSTRUCTIONS
For the OTC Azelaic acid, you can get this OTC. Try The Ordinary available at [employer] or [employer].         Patient Education       Proper skin care from [facility]:    -Eliminate harsh soaps as they strip the natural oils from the skin, often resulting in dry itchy skin ( i.e. Dial, Zest, Tiara Spring)  -Use mild soaps such as Cetaphil or Dove Sensitive Skin in the shower. You do not need to use soap on arms, legs, and trunk every time you shower unless visibly soiled.   -Avoid hot or cold showers.  -After showering, lightly dry off and apply moisturizing within 2-3 minutes. This will help trap moisture in the skin.   -Aggressive use of a moisturizer at least 1-2 times a day to the entire body (including -Vanicream, Cetaphil, Aquaphor or Cerave) and moisturize hands after every washing.  -We recommend using moisturizers that come in a tub that needs to be scooped out, not a pump. This has more of an oil base. It will hold moisture in your skin much better than a water base moisturizer. The above recommended are non-pore clogging.      Wear a sunscreen with at least SPF 30 on your face, ears, neck and V of the chest daily. Wear sunscreen on other areas of the body if those areas are exposed to the sun throughout the day. Sunscreens can contain physical and/or chemical blockers. Physical blockers are less likely to clog pores, these include zinc oxide and titanium dioxide. Reapply every two hour and after swimming.     Sunscreen examples: https://www.ewg.org/sunscreen/    UV radiation  UVA radiation remains constant throughout the day and throughout the year. It is a longer wavelength than UVB and therefore penetrates deeper into the skin leading to immediate and delayed tanning, photoaging, and skin cancer. 70-80% of UVA and UVB radiation occurs between the hours of 10am-2pm.  UVB radiation  UVB radiation causes the most harmful effects and is more significant during the summer months. However, snow and ice can  reflect UVB radiation leading to skin damage during the winter months as well. UVB radiation is responsible for tanning, burning, inflammation, delayed erythema (pinkness), pigmentation (brown spots), and skin cancer.     I recommend self monthly full body exams and yearly full body exams with a dermatology provider. If you develop a new or changing lesion please follow up for examination. Most skin cancers are pink and scaly or pink and pearly. However, we do see blue/brown/black skin cancers.  Consider the ABCDEs of melanoma when giving yourself your monthly full body exam ( don't forget the groin, buttocks, feet, toes, etc). A-asymmetry, B-borders, C-color, D-diameter, E-elevation or evolving. If you see any of these changes please follow up in clinic. If you cannot see your back I recommend purchasing a hand held mirror to use with a larger wall mirror.       Checking for Skin Cancer  You can find cancer early by checking your skin each month. There are 3 kinds of skin cancer. They are melanoma, basal cell carcinoma, and squamous cell carcinoma. Doing monthly skin checks is the best way to find new marks or skin changes. Follow the instructions below for checking your skin.   The ABCDEs of checking moles for melanoma   Check your moles or growths for signs of melanoma using ABCDE:   Asymmetry: the sides of the mole or growth don t match  Border: the edges are ragged, notched, or blurred  Color: the color within the mole or growth varies  Diameter: the mole or growth is larger than 6 mm (size of a pencil eraser)  Evolving: the size, shape, or color of the mole or growth is changing (evolving is not shown in the images below)    Checking for other types of skin cancer  Basal cell carcinoma or squamous cell carcinoma have symptoms such as:     A spot or mole that looks different from all other marks on your skin  Changes in how an area feels, such as itching, tenderness, or pain  Changes in the skin's surface, such  as oozing, bleeding, or scaliness  A sore that does not heal  New swelling or redness beyond the border of a mole    Who s at risk?  Anyone can get skin cancer. But you are at greater risk if you have:   Fair skin, light-colored hair, or light-colored eyes  Many moles or abnormal moles on your skin  A history of sunburns from sunlight or tanning beds  A family history of skin cancer  A history of exposure to radiation or chemicals  A weakened immune system  If you have had skin cancer in the past, you are at risk for recurring skin cancer.   How to check your skin  Do your monthly skin checkups in front of a full-length mirror. Check all parts of your body, including your:   Head (ears, face, neck, and scalp)  Torso (front, back, and sides)  Arms (tops, undersides, upper, and lower armpits)  Hands (palms, backs, and fingers, including under the nails)  Buttocks and genitals  Legs (front, back, and sides)  Feet (tops, soles, toes, including under the nails, and between toes)  If you have a lot of moles, take digital photos of them each month. Make sure to take photos both up close and from a distance. These can help you see if any moles change over time.   Most skin changes are not cancer. But if you see any changes in your skin, call your doctor right away. Only he or she can diagnose a problem. If you have skin cancer, seeing your doctor can be the first step toward getting the treatment that could save your life.   [employer] last reviewed this educational content on 4/1/2019 2000-2020 [employer]. [address] [place] [address], [place] [address]. All rights reserved. This information is not intended as a substitute for professional medical care. Always follow your healthcare professional's instructions.       When should I call my doctor?  If you are worsening or not improving, please, contact us or seek urgent care as noted below.     Who should I call with questions (adults)?  [facility]  [facility] (adult and pediatric): [phone]  [facility] (adult): [phone]  [facility] ([facility] and [place]) [phone]  For urgent needs outside of business hours call the [facility] at [phone] and ask for the dermatology resident on call to be paged  If this is a medical emergency and you are unable to reach an ER, Call [phone]      If you need a prescription refill, please contact your pharmacy. Refills are approved or denied by our Physicians during normal business hours, Monday through Fridays  Per office policy, refills will not be granted if you have not been seen within the past year (or sooner depending on your child's condition) Patient Education       Proper skin care from [facility]:    -Eliminate harsh soaps as they strip the natural oils from the skin, often resulting in dry itchy skin ( i.e. Dial, Zest, [place] Spring)  -Use mild soaps such as Cetaphil or Dove Sensitive Skin in the shower. You do not need to use soap on arms, legs, and trunk every time you shower unless visibly soiled.   -Avoid hot or cold showers.  -After showering, lightly dry off and apply moisturizing within 2-3 minutes. This will help trap moisture in the skin.   -Aggressive use of a moisturizer at least 1-2 times a day to the entire body (including -Vanicream, Cetaphil, Aquaphor or Cerave) and moisturize hands after every washing.  -We recommend using moisturizers that come in a tub that needs to be scooped out, not a pump. This has more of an oil base. It will hold moisture in your skin much better than a water base moisturizer. The above recommended are non-pore clogging.      Wear a sunscreen with at least SPF 30 on your face, ears, neck and V of the chest daily. Wear sunscreen on other areas of the body if those areas are exposed to the sun throughout the day. Sunscreens can contain physical and/or chemical  blockers. Physical blockers are less likely to clog pores, these include zinc oxide and titanium dioxide. Reapply every two hour and after swimming.     Sunscreen examples: https://www.ewg.org/sunscreen/    UV radiation  UVA radiation remains constant throughout the day and throughout the year. It is a longer wavelength than UVB and therefore penetrates deeper into the skin leading to immediate and delayed tanning, photoaging, and skin cancer. 70-80% of UVA and UVB radiation occurs between the hours of 10am-2pm.  UVB radiation  UVB radiation causes the most harmful effects and is more significant during the summer months. However, snow and ice can reflect UVB radiation leading to skin damage during the winter months as well. UVB radiation is responsible for tanning, burning, inflammation, delayed erythema (pinkness), pigmentation (brown spots), and skin cancer.     I recommend self monthly full body exams and yearly full body exams with a dermatology provider. If you develop a new or changing lesion please follow up for examination. Most skin cancers are pink and scaly or pink and pearly. However, we do see blue/brown/black skin cancers.  Consider the ABCDEs of melanoma when giving yourself your monthly full body exam ( don't forget the groin, buttocks, feet, toes, etc). A-asymmetry, B-borders, C-color, D-diameter, E-elevation or evolving. If you see any of these changes please follow up in clinic. If you cannot see your back I recommend purchasing a hand held mirror to use with a larger wall mirror.       Checking for Skin Cancer  You can find cancer early by checking your skin each month. There are 3 kinds of skin cancer. They are melanoma, basal cell carcinoma, and squamous cell carcinoma. Doing monthly skin checks is the best way to find new marks or skin changes. Follow the instructions below for checking your skin.   The ABCDEs of checking moles for melanoma   Check your moles or growths for signs of melanoma  using ABCDE:   Asymmetry: the sides of the mole or growth don t match  Border: the edges are ragged, notched, or blurred  Color: the color within the mole or growth varies  Diameter: the mole or growth is larger than 6 mm (size of a pencil eraser)  Evolving: the size, shape, or color of the mole or growth is changing (evolving is not shown in the images below)    Checking for other types of skin cancer  Basal cell carcinoma or squamous cell carcinoma have symptoms such as:     A spot or mole that looks different from all other marks on your skin  Changes in how an area feels, such as itching, tenderness, or pain  Changes in the skin's surface, such as oozing, bleeding, or scaliness  A sore that does not heal  New swelling or redness beyond the border of a mole    Who s at risk?  Anyone can get skin cancer. But you are at greater risk if you have:   Fair skin, light-colored hair, or light-colored eyes  Many moles or abnormal moles on your skin  A history of sunburns from sunlight or tanning beds  A family history of skin cancer  A history of exposure to radiation or chemicals  A weakened immune system  If you have had skin cancer in the past, you are at risk for recurring skin cancer.   How to check your skin  Do your monthly skin checkups in front of a full-length mirror. Check all parts of your body, including your:   Head (ears, face, neck, and scalp)  Torso (front, back, and sides)  Arms (tops, undersides, upper, and lower armpits)  Hands (palms, backs, and fingers, including under the nails)  Buttocks and genitals  Legs (front, back, and sides)  Feet (tops, soles, toes, including under the nails, and between toes)  If you have a lot of moles, take digital photos of them each month. Make sure to take photos both up close and from a distance. These can help you see if any moles change over time.   Most skin changes are not cancer. But if you see any changes in your skin, call your doctor right away. Only he or she  can diagnose a problem. If you have skin cancer, seeing your doctor can be the first step toward getting the treatment that could save your life.   [employer] last reviewed this educational content on 4/1/2019 2000-2020 [employer]. [address] [place] [address], [place] [address]. All rights reserved. This information is not intended as a substitute for professional medical care. Always follow your healthcare professional's instructions.       When should I call my doctor?  If you are worsening or not improving, please, contact us or seek urgent care as noted below.     Who should I call with questions (adults)?  [facility] (adult and pediatric): [phone]  [facility] (adult): [phone]  [facility] ([place], [place], [place] and [place]) [phone]  For urgent needs outside of business hours call the [facility] at [phone] and ask for the dermatology resident on call to be paged  If this is a medical emergency and you are unable to reach an ER, Call [phone]      If you need a prescription refill, please contact your pharmacy. Refills are approved or denied by our Physicians during normal business hours, Monday through Fridays  Per office policy, refills will not be granted if you have not been seen within the past year (or sooner depending on your child's condition)

## 2024-06-13 ENCOUNTER — OFFICE VISIT (OUTPATIENT)
Dept: DERMATOLOGY | Facility: CLINIC | Age: 29
End: 2024-06-13
Payer: COMMERCIAL

## 2024-06-13 DIAGNOSIS — L74.510 AXILLARY HYPERHIDROSIS: Primary | ICD-10-CM

## 2024-06-13 PROCEDURE — 64650 CHEMODENERV ECCRINE GLANDS: CPT | Performed by: DERMATOLOGY

## 2024-06-13 PROCEDURE — 99207 PR BOTOX 22-25 UNITS WRVU'S ONLY: CPT | Performed by: DERMATOLOGY

## 2024-06-13 PROCEDURE — 99212 OFFICE O/P EST SF 10 MIN: CPT | Mod: 25 | Performed by: DERMATOLOGY

## 2024-06-13 NOTE — PROGRESS NOTES
Jessica Dutta is an extremely pleasant 29 year old year old female patient here today for botox for axillary hyperhidrosis. She has hx of seb derm controled with topicals.   Approved for 200 units.  .  Patient has no other skin complaints today.  Remainder of the HPI, Meds, PMH, Allergies, FH, and SH was reviewed in chart.      Past Medical History:   Diagnosis Date    Birth control     GERD (gastroesophageal reflux disease)     Seasonal allergies        Past Surgical History:   Procedure Laterality Date    ESOPHAGOSCOPY, GASTROSCOPY, DUODENOSCOPY (EGD), COMBINED N/A 4/8/2016    Procedure: COMBINED ESOPHAGOSCOPY, GASTROSCOPY, DUODENOSCOPY (EGD), REMOVE FOREIGN BODY;  Surgeon: Jessica Martinez MD;  Location:  GI        History reviewed. No pertinent family history.    Social History     Socioeconomic History    Marital status: Single     Spouse name: Not on file    Number of children: Not on file    Years of education: Not on file    Highest education level: Not on file   Occupational History    Not on file   Tobacco Use    Smoking status: Never    Smokeless tobacco: Never   Substance and Sexual Activity    Alcohol use: No    Drug use: Not on file    Sexual activity: Never     Birth control/protection: Pill   Other Topics Concern    Parent/sibling w/ CABG, MI or angioplasty before 65F 55M? Not Asked   Social History Narrative    Not on file     Social Determinants of Health     Financial Resource Strain: Low Risk  (12/19/2022)    [facility]    Financial Resource Strain     Difficulty of Paying Living Expenses: 3     Difficulty of Paying Living Expenses: Not on file   Food Insecurity: No Food Insecurity (12/19/2022)    [facility]    Food Insecurity     Worried About Running Out of Food in the Last Year: 1   Transportation Needs: No Transportation Needs (12/19/2022)    [facility]    Transportation Needs     Lack of Transportation (Medical): 1   Physical Activity: Not on file   Stress: Not on file   Social Connections: Unknown (12/20/2023)    [facility]    Social Connections     Frequency of Communication with Friends and Family: Not on file   Interpersonal Safety: Not on file   Housing Stability: Low Risk  (12/19/2022)    Received from [facility] & [facility]    Housing Stability     Unable to Pay for Housing in the Last Year: 1       Outpatient Encounter Medications as of 6/13/2024   Medication Sig Dispense Refill    Aluminum Chloride 12 % SOLN       botulinum toxin type A (BOTOX) 100 units injection       Cetirizine HCl (ZYRTEC ALLERGY PO)       COMPOUNDED NON-CONTROLLED SUBSTANCE (CMPD RX) - PHARMACY TO MIX COMPOUNDED MEDICATION Hydroquinone 6%/tretinoin 0.025%/dexamethasone 0.1% cream sig apply to face at bedtime x 4-6 months then stop 30 g 3    cycloSPORINE (RESTASIS) 0.05 % ophthalmic emulsion 1 drop 2 times daily      desonide (DESOWEN) 0.05 % external cream Apply to face BID x 1-2 weeks then PRN only 60 g 1    dextromethorphan-guaiFENesin (MUCINEX DM)  MG per 12 hr tablet Take 1 tablet by mouth every 12 hours      doxepin (ZONALON) 5 % external cream APPLY TOPICALLY FOUR TIMES DAILY  5    doxepin (ZONALON) 5 % external cream Apply topically up to three times per day prn pain 45 g 3    FINACEA 15 % external gel APPLY THIN LAYER TO AFFECTED AREA ON FACE, NECK,CHEST, BACK, FLANKS, THIGHS AND BUTTOCKS AT NIGHT  11    ketoconazole (NIZORAL) 2 % external cream Apply to AA on the feet, groin BID PRN 60 g 5    Lansoprazole (PREVACID PO)       levonorgestrel-ethinyl estradiol (SEASONALE) 0.15-0.03 MG per tablet Take 1 tablet by mouth daily      lidocaine (XYLOCAINE) 2 % external gel USE TOPICALLY AS NEEDED  6    loteprednol (LOTEMAX/ALREX) 0.2 % SUSP 1 drop 4 times daily      magnesium hydroxide  (MILK OF MAGNESIA) 400 MG/5ML suspension       naproxen (NAPROSYN) 500 MG tablet       olopatadine (PAZEO) 0.7 % ophthalmic solution       Omega-3 Fatty Acids (OMEGA-3 FISH OIL PO)       Probiotic Product (PROBIOTIC DAILY PO)       sulfacetamide sodium, Acne, 10 % lotion Use to face and neck  mL 11    triamcinolone (KENALOG) 0.1 % external cream Apply to arms, legs BID x 1-2 week then PRN only 80 g 2    Triamcinolone Acetonide (NASACORT ALLERGY 24HR NA)       VITAMIN D, CHOLECALCIFEROL, PO Take by mouth daily      clindamycin (CLEOCIN T) 1 % external lotion APPLY THIN LAYER TO FACE, CHEST, BACK,NECK, AND FLANK ONCE DAILY IN THE MORNING      COMPOUNDED NON-CONTROLLED SUBSTANCE (CMPD RX) - PHARMACY TO MIX COMPOUNDED MEDICATION Estradiol 0.01% compounded into cream. Apply pea sized amount every night at bedtime. 60 g 3    COMPOUNDED NON-CONTROLLED SUBSTANCE (CMPD RX) - PHARMACY TO MIX COMPOUNDED MEDICATION Estradiol 0.01% to be compounded with testosterone 0.1% in cream. Apply pea sized amount every night at bedtime. 60 g 3    fluocinolone acetonide 0.01 % OIL PLACE 5 DROPS INTO BOTH EARS 2 TIMES DAILY. AS NEEDED  6    RANITIDINE HCL PO        Facility-Administered Encounter Medications as of 6/13/2024   Medication Dose Route Frequency Provider Last Rate Last Admin    botulinum toxin type A (BOTOX) 100 units injection 100 Units  100 Units Intramuscular Once         botulinum toxin type A (BOTOX) 100 units injection 200 Units  200 Units Intramuscular Once                  O:   NAD, WDWN, Alert & Oriented, Mood & Affect wnl, Vitals stable   General appearance normal   Vitals stable   Alert, oriented and in no acute distress      Face and scalp clear       Eyes: Conjunctivae/lids:Normal     ENT: Lips, mucosa: normal    MSK:Normal    Cardiovascular: peripheral edema none    Pulm: Breathing Normal    Neuro/Psych: Orientation:Alert and Orientedx3 ; Mood/Affect:normal       A/P:  Axillary hyperhidrosis  Seb derm  Cont  desonide prn   Nizoral and sal acid shampoo daily for prevention   It was a pleasure speaking to Jessica uDtta today.      PROCEDURE NOTE    INDICATIONS: This patient presents with symptomatic hyperhidrosis affecting the bilateral palms. Botulinum toxin injection was indicated to treat symptomatic excess sweating. We discussed the principles of treatment, possible complications, and the need for further treatment. Informed consent was obtained. The patient then underwent the following procedure.    PROCEDURE: The patient was taken to the operative suite and properly positioned. The area to be treated was defined and confirmed by the patient and physician. The treatment area was anesthetized with ice water. The area for Botox injection was marked with at least 1 cm between injection sites. Using a concentration of 1 unit per 0.1 ml in sterile saline, a total of 200 injections were placed within the designated treatment area (100 units to the right side and 100 units to the left side). A total of 200 units of Botox was delivered to the treatment area. The patient was discharged alert and ambulatory.

## 2024-06-13 NOTE — LETTER
6/13/2024      Jessica Dutta  [address] [place] [address]      Dear Colleague,    Thank you for referring your patient, Jessica Dutta, to the [facility]. Please see a copy of my visit note below.    Jessica Dutta is an extremely pleasant 29 year old year old female patient here today for botox for axillary hyperhidrosis. She has hx of seb derm controled with topicals.   Approved for 200 units.  .  Patient has no other skin complaints today.  Remainder of the HPI, Meds, PMH, Allergies, FH, and SH was reviewed in chart.      Past Medical History:   Diagnosis Date     Birth control      GERD (gastroesophageal reflux disease)      Seasonal allergies        Past Surgical History:   Procedure Laterality Date     ESOPHAGOSCOPY, GASTROSCOPY, DUODENOSCOPY (EGD), COMBINED N/A 4/8/2016    Procedure: COMBINED ESOPHAGOSCOPY, GASTROSCOPY, DUODENOSCOPY (EGD), REMOVE FOREIGN BODY;  Surgeon: Jessica Martinez MD;  Location:  GI        History reviewed. No pertinent family history.    Social History     Socioeconomic History     Marital status: Single     Spouse name: Not on file     Number of children: Not on file     Years of education: Not on file     Highest education level: Not on file   Occupational History     Not on file   Tobacco Use     Smoking status: Never     Smokeless tobacco: Never   Substance and Sexual Activity     Alcohol use: No     Drug use: Not on file     Sexual activity: Never     Birth control/protection: Pill   Other Topics Concern     Parent/sibling w/ CABG, MI or angioplasty before 65F 55M? Not Asked   Social History Narrative     Not on file     Social Determinants of Health     Financial Resource Strain: Low Risk  (12/19/2022)    [facility] [employer] [employer] [facility]    Financial Resource Strain      Difficulty of Paying Living Expenses: 3      Difficulty of Paying Living Expenses: Not on file   Food Insecurity: No Food  Insecurity (12/19/2022)    Received from [facility] [employer] [facility]    Food Insecurity      Worried About Running Out of Food in the Last Year: 1   Transportation Needs: No Transportation Needs (12/19/2022)    Received from [facility] [email or web address] [facility]    Transportation Needs      Lack of Transportation (Medical): 1   Physical Activity: Not on file   Stress: Not on file   Social Connections: Unknown (12/20/2023)    Received from [facility] [employer] [facility] [email or web address] [facility]    Social Connections      Frequency of Communication with Friends and Family: Not on file   Interpersonal Safety: Not on file   Housing Stability: Low Risk  (12/19/2022)    Received from [facility] [employer] [facility] [email or web address] [facility]    Housing Stability      Unable to Pay for Housing in the Last Year: 1       Outpatient Encounter Medications as of 6/13/2024   Medication Sig Dispense Refill     Aluminum Chloride 12 % SOLN        botulinum toxin type A (BOTOX) 100 units injection        Cetirizine HCl (ZYRTEC ALLERGY PO)        COMPOUNDED NON-CONTROLLED SUBSTANCE (CMPD RX) - PHARMACY TO MIX COMPOUNDED MEDICATION Hydroquinone 6%/tretinoin 0.025%/dexamethasone 0.1% cream sig apply to face at bedtime x 4-6 months then stop 30 g 3     cycloSPORINE (RESTASIS) 0.05 % ophthalmic emulsion 1 drop 2 times daily       desonide (DESOWEN) 0.05 % external cream Apply to face BID x 1-2 weeks then PRN only 60 g 1     dextromethorphan-guaiFENesin (MUCINEX DM)  MG per 12 hr tablet Take 1 tablet by mouth every 12 hours       doxepin (ZONALON) 5 % external cream APPLY TOPICALLY FOUR TIMES DAILY  5     doxepin (ZONALON) 5 % external cream Apply topically up to three times per day prn pain 45 g 3     FINACEA 15 % external gel APPLY THIN LAYER TO AFFECTED AREA ON FACE, NECK,CHEST, BACK, FLANKS, THIGHS AND BUTTOCKS AT NIGHT  11     ketoconazole (NIZORAL) 2 % external cream Apply to AA on the feet, groin BID PRN 60 g 5      Lansoprazole (PREVACID PO)        levonorgestrel-ethinyl estradiol (SEASONALE) 0.15-0.03 MG per tablet Take 1 tablet by mouth daily       lidocaine (XYLOCAINE) 2 % external gel USE TOPICALLY AS NEEDED  6     loteprednol (LOTEMAX/ALREX) 0.2 % SUSP 1 drop 4 times daily       magnesium hydroxide (MILK OF MAGNESIA) 400 MG/5ML suspension        naproxen (NAPROSYN) 500 MG tablet        olopatadine (PAZEO) 0.7 % ophthalmic solution        Omega-3 Fatty Acids (OMEGA-3 FISH OIL PO)        Probiotic Product (PROBIOTIC DAILY PO)        sulfacetamide sodium, Acne, 10 % lotion Use to face and neck  mL 11     triamcinolone (KENALOG) 0.1 % external cream Apply to arms, legs BID x 1-2 week then PRN only 80 g 2     Triamcinolone Acetonide (NASACORT ALLERGY 24HR NA)        VITAMIN D, CHOLECALCIFEROL, PO Take by mouth daily       clindamycin (CLEOCIN T) 1 % external lotion APPLY THIN LAYER TO FACE, CHEST, BACK,NECK, AND FLANK ONCE DAILY IN THE MORNING       COMPOUNDED NON-CONTROLLED SUBSTANCE (CMPD RX) - PHARMACY TO MIX COMPOUNDED MEDICATION Estradiol 0.01% compounded into cream. Apply pea sized amount every night at bedtime. 60 g 3     COMPOUNDED NON-CONTROLLED SUBSTANCE (CMPD RX) - PHARMACY TO MIX COMPOUNDED MEDICATION Estradiol 0.01% to be compounded with testosterone 0.1% in cream. Apply pea sized amount every night at bedtime. 60 g 3     fluocinolone acetonide 0.01 % OIL PLACE 5 DROPS INTO BOTH EARS 2 TIMES DAILY. AS NEEDED  6     RANITIDINE HCL PO        Facility-Administered Encounter Medications as of 6/13/2024   Medication Dose Route Frequency Provider Last Rate Last Admin     botulinum toxin type A (BOTOX) 100 units injection 100 Units  100 Units Intramuscular Once          botulinum toxin type A (BOTOX) 100 units injection 200 Units  200 Units Intramuscular Once                  O:   NAD, WDWN, Alert & Oriented, Mood & Affect wnl, Vitals stable   General appearance normal   Vitals stable   Alert, oriented and in no  acute distress      Face and scalp clear       Eyes: Conjunctivae/lids:Normal     ENT: Lips, mucosa: normal    MSK:Normal    Cardiovascular: peripheral edema none    Pulm: Breathing Normal    Neuro/Psych: Orientation:Alert and Orientedx3 ; Mood/Affect:normal       A/P:  Axillary hyperhidrosis  Seb derm  Cont desonide prn   Nizoral and sal acid shampoo daily for prevention   It was a pleasure speaking to Jessica Dutta today.      PROCEDURE NOTE    INDICATIONS: This patient presents with symptomatic hyperhidrosis affecting the bilateral palms. Botulinum toxin injection was indicated to treat symptomatic excess sweating. We discussed the principles of treatment, possible complications, and the need for further treatment. Informed consent was obtained. The patient then underwent the following procedure.    PROCEDURE: The patient was taken to the operative suite and properly positioned. The area to be treated was defined and confirmed by the patient and physician. The treatment area was anesthetized with ice water. The area for Botox injection was marked with at least 1 cm between injection sites. Using a concentration of 1 unit per 0.1 ml in sterile saline, a total of 200 injections were placed within the designated treatment area (100 units to the right side and 100 units to the left side). A total of 200 units of Botox was delivered to the treatment area. The patient was discharged alert and ambulatory.       Again, thank you for allowing me to participate in the care of your patient.        Sincerely,        Javier Callejas MD

## 2024-07-13 ENCOUNTER — HEALTH MAINTENANCE LETTER (OUTPATIENT)
Age: 29
End: 2024-07-13

## 2024-09-19 ENCOUNTER — OFFICE VISIT (OUTPATIENT)
Dept: DERMATOLOGY | Facility: CLINIC | Age: 29
End: 2024-09-19
Payer: COMMERCIAL

## 2024-09-19 DIAGNOSIS — L21.9 DERMATITIS, SEBORRHEIC: Primary | ICD-10-CM

## 2024-09-19 DIAGNOSIS — L74.510 AXILLARY HYPERHIDROSIS: ICD-10-CM

## 2024-09-19 DIAGNOSIS — L30.4 INTERTRIGO: ICD-10-CM

## 2024-09-19 PROCEDURE — 64650 CHEMODENERV ECCRINE GLANDS: CPT | Performed by: DERMATOLOGY

## 2024-09-19 PROCEDURE — 99213 OFFICE O/P EST LOW 20 MIN: CPT | Mod: 25 | Performed by: DERMATOLOGY

## 2024-09-19 PROCEDURE — 99207 PR BOTOX 22-25 UNITS WRVU'S ONLY: CPT | Performed by: DERMATOLOGY

## 2024-09-19 RX ORDER — FLUOCINONIDE 0.5 MG/G
CREAM TOPICAL 2 TIMES DAILY
Qty: 120 G | Refills: 3 | Status: SHIPPED | OUTPATIENT
Start: 2024-09-19

## 2024-09-19 RX ORDER — FLUOCINONIDE 0.5 MG/G
CREAM TOPICAL
COMMUNITY
Start: 2023-12-19 | End: 2024-09-19

## 2024-09-19 RX ORDER — KETOCONAZOLE 20 MG/G
CREAM TOPICAL
Qty: 60 G | Refills: 5 | Status: SHIPPED | OUTPATIENT
Start: 2024-09-19

## 2024-09-19 NOTE — PROGRESS NOTES
Jessica Dutta is an extremely pleasant 29 year old year old female patient here today for hyperhidrosis, botox has worked well.  Patient has no other skin complaints today.  Remainder of the HPI, Meds, PMH, Allergies, FH, and SH was reviewed in chart.      Past Medical History:   Diagnosis Date    Birth control     GERD (gastroesophageal reflux disease)     Seasonal allergies        Past Surgical History:   Procedure Laterality Date    ESOPHAGOSCOPY, GASTROSCOPY, DUODENOSCOPY (EGD), COMBINED N/A 4/8/2016    Procedure: COMBINED ESOPHAGOSCOPY, GASTROSCOPY, DUODENOSCOPY (EGD), REMOVE FOREIGN BODY;  Surgeon: Jessica Martinez MD;  Location:  GI        No family history on file.    Social History     Socioeconomic History    Marital status: Single     Spouse name: Not on file    Number of children: Not on file    Years of education: Not on file    Highest education level: Not on file   Occupational History    Not on file   Tobacco Use    Smoking status: Never    Smokeless tobacco: Never   Substance and Sexual Activity    Alcohol use: No    Drug use: Not on file    Sexual activity: Never     Birth control/protection: Pill   Other Topics Concern    Parent/sibling w/ CABG, MI or angioplasty before 65F 55M? Not Asked   Social History Narrative    Not on file     Social Determinants of Health     Financial Resource Strain: Low Risk  (7/1/2024)    [facility]    Financial Resource Strain     Difficulty of Paying Living Expenses: 3     Difficulty of Paying Living Expenses: Not on file   Food Insecurity: No Food Insecurity (7/1/2024)    Received from [facility]    Food Insecurity     Worried About Running Out of Food in the Last Year: 1   Transportation Needs: No Transportation Needs (7/1/2024)    [facility]    Transportation Needs     Lack of Transportation (Medical): 1   Physical Activity:  Not on file   Stress: Not on file   Social Connections: Socially Integrated (7/1/2024)    Received from [facility] [facility]    Social Connections     Frequency of Communication with Friends and Family: 0   Interpersonal Safety: Not on file   Housing Stability: Low Risk  (7/1/2024)    Received from [facility] [facility]    Housing Stability     Unable to Pay for Housing in the Last Year: 1       Outpatient Encounter Medications as of 9/19/2024   Medication Sig Dispense Refill    Aluminum Chloride 12 % SOLN       botulinum toxin type A (BOTOX) 100 units injection       Cetirizine HCl (ZYRTEC ALLERGY PO)       clindamycin (CLEOCIN T) 1 % external lotion APPLY THIN LAYER TO FACE, CHEST, BACK,NECK, AND FLANK ONCE DAILY IN THE MORNING      COMPOUNDED NON-CONTROLLED SUBSTANCE (CMPD RX) - PHARMACY TO MIX COMPOUNDED MEDICATION Hydroquinone 6%/tretinoin 0.025%/dexamethasone 0.1% cream sig apply to face at bedtime x 4-6 months then stop 30 g 3    COMPOUNDED NON-CONTROLLED SUBSTANCE (CMPD RX) - PHARMACY TO MIX COMPOUNDED MEDICATION Estradiol 0.01% compounded into cream. Apply pea sized amount every night at bedtime. 60 g 3    COMPOUNDED NON-CONTROLLED SUBSTANCE (CMPD RX) - PHARMACY TO MIX COMPOUNDED MEDICATION Estradiol 0.01% to be compounded with testosterone 0.1% in cream. Apply pea sized amount every night at bedtime. 60 g 3    cycloSPORINE (RESTASIS) 0.05 % ophthalmic emulsion 1 drop 2 times daily      desonide (DESOWEN) 0.05 % external cream Apply to face BID x 1-2 weeks then PRN only 60 g 1    dextromethorphan-guaiFENesin (MUCINEX DM)  MG per 12 hr tablet Take 1 tablet by mouth every 12 hours      doxepin (ZONALON) 5 % external cream APPLY TOPICALLY FOUR TIMES DAILY  5    doxepin (ZONALON) 5 % external cream Apply topically up to three times per day prn pain 45 g 3    FINACEA 15 % external gel APPLY THIN LAYER TO AFFECTED AREA ON FACE, NECK,CHEST, BACK, FLANKS, THIGHS  AND BUTTOCKS AT NIGHT  11    fluocinolone acetonide 0.01 % OIL PLACE 5 DROPS INTO BOTH EARS 2 TIMES DAILY. AS NEEDED  6    ketoconazole (NIZORAL) 2 % external cream Apply to AA on the feet, groin BID PRN 60 g 5    Lansoprazole (PREVACID PO)       levonorgestrel-ethinyl estradiol (SEASONALE) 0.15-0.03 MG per tablet Take 1 tablet by mouth daily      lidocaine (XYLOCAINE) 2 % external gel USE TOPICALLY AS NEEDED  6    loteprednol (LOTEMAX/ALREX) 0.2 % SUSP 1 drop 4 times daily      magnesium hydroxide (MILK OF MAGNESIA) 400 MG/5ML suspension       naproxen (NAPROSYN) 500 MG tablet       olopatadine (PAZEO) 0.7 % ophthalmic solution       Omega-3 Fatty Acids (OMEGA-3 FISH OIL PO)       Probiotic Product (PROBIOTIC DAILY PO)       RANITIDINE HCL PO       sulfacetamide sodium, Acne, 10 % lotion Use to face and neck  mL 11    triamcinolone (KENALOG) 0.1 % external cream Apply to arms, legs BID x 1-2 week then PRN only 80 g 2    Triamcinolone Acetonide (NASACORT ALLERGY 24HR NA)       VITAMIN D, CHOLECALCIFEROL, PO Take by mouth daily       Facility-Administered Encounter Medications as of 9/19/2024   Medication Dose Route Frequency Provider Last Rate Last Admin    botulinum toxin type A (BOTOX) 100 units injection 100 Units  100 Units Intramuscular Once         botulinum toxin type A (BOTOX) 100 units injection 200 Units  200 Units Intramuscular Once                  O:   NAD, WDWN, Alert & Oriented, Mood & Affect wnl, Vitals stable   General appearance normal   Vitals stable   Alert, oriented and in no acute distress   Sweating in axilla  Face clear     Eyes: Conjunctivae/lids:Normal     ENT: Lips, mucosa: normal    MSK:Normal    Cardiovascular: peripheral edema none    Pulm: Breathing Normal    Neuro/Psych: Orientation:Alert and Orientedx3 ; Mood/Affect:normal       A/P:  1. Seb derm stable with desonide and nizoral   2. Axillary hyperhidrosis   Botox today   It was a pleasure speaking to Jessica Dutta  today.         PROCEDURE NOTE     INDICATIONS: This patient presents with symptomatic hyperhidrosis affecting the bilateral palms. Botulinum toxin injection was indicated to treat symptomatic excess sweating. We discussed the principles of treatment, possible complications, and the need for further treatment. Informed consent was obtained. The patient then underwent the following procedure.    PROCEDURE: The patient was taken to the operative suite and properly positioned. The area to be treated was defined and confirmed by the patient and physician. The treatment area was anesthetized with ice water. The area for Botox injection was marked with at least 1 cm between injection sites. Using a concentration of 1 unit per 0.1 ml in sterile saline, a total of 200 injections were placed within the designated treatment area (100 units to the right side and 100 units to the left side). A total of 200 units of Botox was delivered to the treatment area. The patient was discharged alert and ambulatory.

## 2024-09-19 NOTE — LETTER
9/19/2024      Jessica Dutta  [address] [place] [address]      Dear Colleague,    Thank you for referring your patient, Jessica Dutta, to the [facility]. Please see a copy of my visit note below.    Jessica Dutta is an extremely pleasant 29 year old year old female patient here today for hyperhidrosis, botox has worked well.  Patient has no other skin complaints today.  Remainder of the HPI, Meds, PMH, Allergies, FH, and SH was reviewed in chart.      Past Medical History:   Diagnosis Date     Birth control      GERD (gastroesophageal reflux disease)      Seasonal allergies        Past Surgical History:   Procedure Laterality Date     ESOPHAGOSCOPY, GASTROSCOPY, DUODENOSCOPY (EGD), COMBINED N/A 4/8/2016    Procedure: COMBINED ESOPHAGOSCOPY, GASTROSCOPY, DUODENOSCOPY (EGD), REMOVE FOREIGN BODY;  Surgeon: Jessica Martinez MD;  Location: [facility]        No family history on file.    Social History     Socioeconomic History     Marital status: Single     Spouse name: Not on file     Number of children: Not on file     Years of education: Not on file     Highest education level: Not on file   Occupational History     Not on file   Tobacco Use     Smoking status: Never     Smokeless tobacco: Never   Substance and Sexual Activity     Alcohol use: No     Drug use: Not on file     Sexual activity: Never     Birth control/protection: Pill   Other Topics Concern     Parent/sibling w/ CABG, MI or angioplasty before 65F 55M? Not Asked   Social History Narrative     Not on file     Social Determinants of Health     Financial Resource Strain: Low Risk  (7/1/2024)    Received from [employer]    Financial Resource Strain      Difficulty of Paying Living Expenses: 3      Difficulty of Paying Living Expenses: Not on file   Food Insecurity: No Food Insecurity (7/1/2024)    Received from [employer]    Food Insecurity       Worried About Running Out of Food in the Last Year: 1   Transportation Needs: No Transportation Needs (7/1/2024)    [facility]    Transportation Needs      Lack of Transportation (Medical): 1   Physical Activity: Not on file   Stress: Not on file   Social Connections: Socially Integrated (7/1/2024)    Received from [facility]    Social Connections      Frequency of Communication with Friends and Family: 0   Interpersonal Safety: Not on file   Housing Stability: Low Risk  (7/1/2024)    Received from [facility]    Housing Stability      Unable to Pay for Housing in the Last Year: 1       Outpatient Encounter Medications as of 9/19/2024   Medication Sig Dispense Refill     Aluminum Chloride 12 % SOLN        botulinum toxin type A (BOTOX) 100 units injection        Cetirizine HCl (ZYRTEC ALLERGY PO)        clindamycin (CLEOCIN T) 1 % external lotion APPLY THIN LAYER TO FACE, CHEST, BACK,NECK, AND FLANK ONCE DAILY IN THE MORNING       COMPOUNDED NON-CONTROLLED SUBSTANCE (CMPD RX) - PHARMACY TO MIX COMPOUNDED MEDICATION Hydroquinone 6%/tretinoin 0.025%/dexamethasone 0.1% cream sig apply to face at bedtime x 4-6 months then stop 30 g 3     COMPOUNDED NON-CONTROLLED SUBSTANCE (CMPD RX) - PHARMACY TO MIX COMPOUNDED MEDICATION Estradiol 0.01% compounded into cream. Apply pea sized amount every night at bedtime. 60 g 3     COMPOUNDED NON-CONTROLLED SUBSTANCE (CMPD RX) - PHARMACY TO MIX COMPOUNDED MEDICATION Estradiol 0.01% to be compounded with testosterone 0.1% in cream. Apply pea sized amount every night at bedtime. 60 g 3     cycloSPORINE (RESTASIS) 0.05 % ophthalmic emulsion 1 drop 2 times daily       desonide (DESOWEN) 0.05 % external cream Apply to face BID x 1-2 weeks then PRN only 60 g 1     dextromethorphan-guaiFENesin (MUCINEX DM)  MG per 12 hr tablet Take 1 tablet by mouth every 12 hours        doxepin (ZONALON) 5 % external cream APPLY TOPICALLY FOUR TIMES DAILY  5     doxepin (ZONALON) 5 % external cream Apply topically up to three times per day prn pain 45 g 3     FINACEA 15 % external gel APPLY THIN LAYER TO AFFECTED AREA ON FACE, NECK,CHEST, BACK, FLANKS, THIGHS AND BUTTOCKS AT NIGHT  11     fluocinolone acetonide 0.01 % OIL PLACE 5 DROPS INTO BOTH EARS 2 TIMES DAILY. AS NEEDED  6     ketoconazole (NIZORAL) 2 % external cream Apply to AA on the feet, groin BID PRN 60 g 5     Lansoprazole (PREVACID PO)        levonorgestrel-ethinyl estradiol (SEASONALE) 0.15-0.03 MG per tablet Take 1 tablet by mouth daily       lidocaine (XYLOCAINE) 2 % external gel USE TOPICALLY AS NEEDED  6     loteprednol (LOTEMAX/ALREX) 0.2 % SUSP 1 drop 4 times daily       magnesium hydroxide (MILK OF MAGNESIA) 400 MG/5ML suspension        naproxen (NAPROSYN) 500 MG tablet        olopatadine (PAZEO) 0.7 % ophthalmic solution        Omega-3 Fatty Acids (OMEGA-3 FISH OIL PO)        Probiotic Product (PROBIOTIC DAILY PO)        RANITIDINE HCL PO        sulfacetamide sodium, Acne, 10 % lotion Use to face and neck  mL 11     triamcinolone (KENALOG) 0.1 % external cream Apply to arms, legs BID x 1-2 week then PRN only 80 g 2     Triamcinolone Acetonide (NASACORT ALLERGY 24HR NA)        VITAMIN D, CHOLECALCIFEROL, PO Take by mouth daily       Facility-Administered Encounter Medications as of 9/19/2024   Medication Dose Route Frequency Provider Last Rate Last Admin     botulinum toxin type A (BOTOX) 100 units injection 100 Units  100 Units Intramuscular Once          botulinum toxin type A (BOTOX) 100 units injection 200 Units  200 Units Intramuscular Once                  O:   NAD, WDWN, Alert & Oriented, Mood & Affect wnl, Vitals stable   General appearance normal   Vitals stable   Alert, oriented and in no acute distress   Sweating in axilla  Face clear     Eyes: Conjunctivae/lids:Normal     ENT: Lips, mucosa:  normal    MSK:Normal    Cardiovascular: peripheral edema none    Pulm: Breathing Normal    Neuro/Psych: Orientation:Alert and Orientedx3 ; Mood/Affect:normal       A/P:  1. Seb derm stable with desonide and nizoral   2. Axillary hyperhidrosis   Botox today   It was a pleasure speaking to Jessica Dutta today.         PROCEDURE NOTE     INDICATIONS: This patient presents with symptomatic hyperhidrosis affecting the bilateral palms. Botulinum toxin injection was indicated to treat symptomatic excess sweating. We discussed the principles of treatment, possible complications, and the need for further treatment. Informed consent was obtained. The patient then underwent the following procedure.    PROCEDURE: The patient was taken to the operative suite and properly positioned. The area to be treated was defined and confirmed by the patient and physician. The treatment area was anesthetized with ice water. The area for Botox injection was marked with at least 1 cm between injection sites. Using a concentration of 1 unit per 0.1 ml in sterile saline, a total of 200 injections were placed within the designated treatment area (100 units to the right side and 100 units to the left side). A total of 200 units of Botox was delivered to the treatment area. The patient was discharged alert and ambulatory.       Again, thank you for allowing me to participate in the care of your patient.        Sincerely,        Javier Callejas MD

## 2024-12-18 DIAGNOSIS — L30.9 DERMATITIS: ICD-10-CM

## 2024-12-19 ENCOUNTER — OFFICE VISIT (OUTPATIENT)
Dept: DERMATOLOGY | Facility: CLINIC | Age: 29
End: 2024-12-19
Payer: COMMERCIAL

## 2024-12-19 DIAGNOSIS — L74.510 AXILLARY HYPERHIDROSIS: Primary | ICD-10-CM

## 2024-12-19 RX ORDER — DESONIDE 0.5 MG/G
CREAM TOPICAL
Qty: 60 G | Refills: 1 | Status: SHIPPED | OUTPATIENT
Start: 2024-12-19

## 2024-12-19 NOTE — LETTER
12/19/2024      Jessica Dutta  [address] [place] [address]      Dear Colleague,    Thank you for referring your patient, Jessica Dutta, to the [facility]. Please see a copy of my visit note below.    Jessica Dutta is an extremely pleasant 29 year old year old female patient here today for botox for hyperhidrosis.  Patient has no other skin complaints today.  Remainder of the HPI, Meds, PMH, Allergies, FH, and SH was reviewed in chart.      Past Medical History:   Diagnosis Date     Birth control      GERD (gastroesophageal reflux disease)      Seasonal allergies        Past Surgical History:   Procedure Laterality Date     ESOPHAGOSCOPY, GASTROSCOPY, DUODENOSCOPY (EGD), COMBINED N/A 4/8/2016    Procedure: COMBINED ESOPHAGOSCOPY, GASTROSCOPY, DUODENOSCOPY (EGD), REMOVE FOREIGN BODY;  Surgeon: Jessica Martinez MD;  Location:  GI        History reviewed. No pertinent family history.    Social History     Socioeconomic History     Marital status: Single     Spouse name: Not on file     Number of children: Not on file     Years of education: Not on file     Highest education level: Not on file   Occupational History     Not on file   Tobacco Use     Smoking status: Never     Smokeless tobacco: Never   Substance and Sexual Activity     Alcohol use: No     Drug use: Not on file     Sexual activity: Never     Birth control/protection: Pill   Other Topics Concern     Parent/sibling w/ CABG, MI or angioplasty before 65F 55M? Not Asked   Social History Narrative     Not on file     Social Drivers of Health     Financial Resource Strain: Low Risk  (7/1/2024)    Received from "[employer]    Financial Resource Strain      Difficulty of Paying Living Expenses: 3      Difficulty of Paying Living Expenses: Not on file   Food Insecurity: No Food Insecurity (7/1/2024)    Received from "[employer]    Food  Insecurity      Do you worry your food will run out before you are able to buy more?: 1   Transportation Needs: No Transportation Needs (7/1/2024)    [facility]    Transportation Needs      Does lack of transportation keep you from medical appointments?: 1      Does lack of transportation keep you from work, meetings or getting things that you need?: 1   Physical Activity: Not on file   Stress: Not on file   Social Connections: Socially Integrated (7/1/2024)    [facility]    Social Connections      Do you often feel lonely or isolated from those around you?: 0   Interpersonal Safety: Not on file   Housing Stability: Low Risk  (7/1/2024)    Received from [facility]    Housing Stability      What is your housing situation today?: 1       Outpatient Encounter Medications as of 12/19/2024   Medication Sig Dispense Refill     Aluminum Chloride 12 % SOLN        botulinum toxin type A (BOTOX) 100 units injection        Cetirizine HCl (ZYRTEC ALLERGY PO)        COMPOUNDED NON-CONTROLLED SUBSTANCE (CMPD RX) - PHARMACY TO MIX COMPOUNDED MEDICATION Hydroquinone 6%/tretinoin 0.025%/dexamethasone 0.1% cream sig apply to face at bedtime x 4-6 months then stop 30 g 3     cycloSPORINE (RESTASIS) 0.05 % ophthalmic emulsion 1 drop 2 times daily       desonide (DESOWEN) 0.05 % external cream Apply to face BID x 1-2 weeks then PRN only 60 g 1     dextromethorphan-guaiFENesin (MUCINEX DM)  MG per 12 hr tablet Take 1 tablet by mouth every 12 hours       doxepin (ZONALON) 5 % external cream APPLY TOPICALLY FOUR TIMES DAILY  5     doxepin (ZONALON) 5 % external cream Apply topically up to three times per day prn pain 45 g 3     FINACEA 15 % external gel APPLY THIN LAYER TO AFFECTED AREA ON FACE, NECK,CHEST, BACK, FLANKS, THIGHS AND BUTTOCKS AT NIGHT  11     fluocinonide (LIDEX) 0.05 % external cream Apply  topically 2 times daily. 120 g 3     ketoconazole (NIZORAL) 2 % external cream Apply to AA on the feet, groin BID PRN 60 g 5     Lansoprazole (PREVACID PO)        levonorgestrel-ethinyl estradiol (SEASONALE) 0.15-0.03 MG per tablet Take 1 tablet by mouth daily       lidocaine (XYLOCAINE) 2 % external gel USE TOPICALLY AS NEEDED  6     loteprednol (LOTEMAX/ALREX) 0.2 % SUSP 1 drop 4 times daily       magnesium hydroxide (MILK OF MAGNESIA) 400 MG/5ML suspension        naproxen (NAPROSYN) 500 MG tablet        olopatadine (PAZEO) 0.7 % ophthalmic solution        Omega-3 Fatty Acids (OMEGA-3 FISH OIL PO)        Probiotic Product (PROBIOTIC DAILY PO)        sulfacetamide sodium, Acne, 10 % lotion Use to face and neck  mL 11     triamcinolone (KENALOG) 0.1 % external cream Apply to arms, legs BID x 1-2 week then PRN only 80 g 2     Triamcinolone Acetonide (NASACORT ALLERGY 24HR NA)        VITAMIN D, CHOLECALCIFEROL, PO Take by mouth daily       clindamycin (CLEOCIN T) 1 % external lotion APPLY THIN LAYER TO FACE, CHEST, BACK,NECK, AND FLANK ONCE DAILY IN THE MORNING       COMPOUNDED NON-CONTROLLED SUBSTANCE (CMPD RX) - PHARMACY TO MIX COMPOUNDED MEDICATION Estradiol 0.01% compounded into cream. Apply pea sized amount every night at bedtime. 60 g 3     COMPOUNDED NON-CONTROLLED SUBSTANCE (CMPD RX) - PHARMACY TO MIX COMPOUNDED MEDICATION Estradiol 0.01% to be compounded with testosterone 0.1% in cream. Apply pea sized amount every night at bedtime. 60 g 3     fluocinolone acetonide 0.01 % OIL PLACE 5 DROPS INTO BOTH EARS 2 TIMES DAILY. AS NEEDED  6     RANITIDINE HCL PO        Facility-Administered Encounter Medications as of 12/19/2024   Medication Dose Route Frequency Provider Last Rate Last Admin     botulinum toxin type A (BOTOX) 100 units injection 100 Units  100 Units Intramuscular Once          botulinum toxin type A (BOTOX) 100 units injection 200 Units  200 Units Intramuscular Once                  O:   NAD,  WDWN, Alert & Oriented, Mood & Affect wnl, Vitals stable   General appearance normal   Vitals stable   Alert, oriented and in no acute distress     Sweating in axilla      Eyes: Conjunctivae/lids:Normal     ENT: Lips, mucosa: normal    MSK:Normal    Cardiovascular: peripheral edema none    Pulm: Breathing Normal    Neuro/Psych: Orientation:Alert and Orientedx3 ; Mood/Affect:normal       A/P:  Axillary hyperhidrosis   Botox today   It was a pleasure speaking to Jessica Dutta today.     PROCEDURE NOTE     INDICATIONS: This patient presents with symptomatic hyperhidrosis affecting the bilateral palms. Botulinum toxin injection was indicated to treat symptomatic excess sweating. We discussed the principles of treatment, possible complications, and the need for further treatment. Informed consent was obtained. The patient then underwent the following procedure.    PROCEDURE: The patient was taken to the operative suite and properly positioned. The area to be treated was defined and confirmed by the patient and physician. The treatment area was anesthetized with ice water. The area for Botox injection was marked with at least 1 cm between injection sites. Using a concentration of 1 unit per 0.1 ml in sterile saline, a total of 200 injections were placed within the designated treatment area (100 units to the right side and 100 units to the left side). A total of 200 units of Botox was delivered to the treatment area. The patient was discharged alert and ambulatory.       Again, thank you for allowing me to participate in the care of your patient.        Sincerely,        Javier Callejas MD

## 2024-12-19 NOTE — PROGRESS NOTES
Jessica Dutta is an extremely pleasant 29 year old year old female patient here today for botox for hyperhidrosis.  Patient has no other skin complaints today.  Remainder of the HPI, Meds, PMH, Allergies, FH, and SH was reviewed in chart.      Past Medical History:   Diagnosis Date    Birth control     GERD (gastroesophageal reflux disease)     Seasonal allergies        Past Surgical History:   Procedure Laterality Date    ESOPHAGOSCOPY, GASTROSCOPY, DUODENOSCOPY (EGD), COMBINED N/A 4/8/2016    Procedure: COMBINED ESOPHAGOSCOPY, GASTROSCOPY, DUODENOSCOPY (EGD), REMOVE FOREIGN BODY;  Surgeon: Jessica Martinez MD;  Location:  GI        History reviewed. No pertinent family history.    Social History     Socioeconomic History    Marital status: Single     Spouse name: Not on file    Number of children: Not on file    Years of education: Not on file    Highest education level: Not on file   Occupational History    Not on file   Tobacco Use    Smoking status: Never    Smokeless tobacco: Never   Substance and Sexual Activity    Alcohol use: No    Drug use: Not on file    Sexual activity: Never     Birth control/protection: Pill   Other Topics Concern    Parent/sibling w/ CABG, MI or angioplasty before 65F 55M? Not Asked   Social History Narrative    Not on file     Social Drivers of Health     Financial Resource Strain: Low Risk  (7/1/2024)    Received from [facility]    Financial Resource Strain     Difficulty of Paying Living Expenses: 3     Difficulty of Paying Living Expenses: Not on file   Food Insecurity: No Food Insecurity (7/1/2024)    Received from [facility]    Food Insecurity     Do you worry your food will run out before you are able to buy more?: 1   Transportation Needs: No Transportation Needs (7/1/2024)    Received from [facility]    Transportation Needs     Does lack of transportation keep you  from medical appointments?: 1     Does lack of transportation keep you from work, meetings or getting things that you need?: 1   Physical Activity: Not on file   Stress: Not on file   Social Connections: Socially Integrated (7/1/2024)    Received from [facility]    Social Connections     Do you often feel lonely or isolated from those around you?: 0   Interpersonal Safety: Not on file   Housing Stability: Low Risk  (7/1/2024)    Received from [facility]    Housing Stability     What is your housing situation today?: 1       Outpatient Encounter Medications as of 12/19/2024   Medication Sig Dispense Refill    Aluminum Chloride 12 % SOLN       botulinum toxin type A (BOTOX) 100 units injection       Cetirizine HCl (ZYRTEC ALLERGY PO)       COMPOUNDED NON-CONTROLLED SUBSTANCE (CMPD RX) - PHARMACY TO MIX COMPOUNDED MEDICATION Hydroquinone 6%/tretinoin 0.025%/dexamethasone 0.1% cream sig apply to face at bedtime x 4-6 months then stop 30 g 3    cycloSPORINE (RESTASIS) 0.05 % ophthalmic emulsion 1 drop 2 times daily      desonide (DESOWEN) 0.05 % external cream Apply to face BID x 1-2 weeks then PRN only 60 g 1    dextromethorphan-guaiFENesin (MUCINEX DM)  MG per 12 hr tablet Take 1 tablet by mouth every 12 hours      doxepin (ZONALON) 5 % external cream APPLY TOPICALLY FOUR TIMES DAILY  5    doxepin (ZONALON) 5 % external cream Apply topically up to three times per day prn pain 45 g 3    FINACEA 15 % external gel APPLY THIN LAYER TO AFFECTED AREA ON FACE, NECK,CHEST, BACK, FLANKS, THIGHS AND BUTTOCKS AT NIGHT  11    fluocinonide (LIDEX) 0.05 % external cream Apply topically 2 times daily. 120 g 3    ketoconazole (NIZORAL) 2 % external cream Apply to AA on the feet, groin BID PRN 60 g 5    Lansoprazole (PREVACID PO)       levonorgestrel-ethinyl estradiol (SEASONALE) 0.15-0.03 MG per tablet Take 1 tablet by mouth daily      lidocaine (XYLOCAINE) 2 %  external gel USE TOPICALLY AS NEEDED  6    loteprednol (LOTEMAX/ALREX) 0.2 % SUSP 1 drop 4 times daily      magnesium hydroxide (MILK OF MAGNESIA) 400 MG/5ML suspension       naproxen (NAPROSYN) 500 MG tablet       olopatadine (PAZEO) 0.7 % ophthalmic solution       Omega-3 Fatty Acids (OMEGA-3 FISH OIL PO)       Probiotic Product (PROBIOTIC DAILY PO)       sulfacetamide sodium, Acne, 10 % lotion Use to face and neck  mL 11    triamcinolone (KENALOG) 0.1 % external cream Apply to arms, legs BID x 1-2 week then PRN only 80 g 2    Triamcinolone Acetonide (NASACORT ALLERGY 24HR NA)       VITAMIN D, CHOLECALCIFEROL, PO Take by mouth daily      clindamycin (CLEOCIN T) 1 % external lotion APPLY THIN LAYER TO FACE, CHEST, BACK,NECK, AND FLANK ONCE DAILY IN THE MORNING      COMPOUNDED NON-CONTROLLED SUBSTANCE (CMPD RX) - PHARMACY TO MIX COMPOUNDED MEDICATION Estradiol 0.01% compounded into cream. Apply pea sized amount every night at bedtime. 60 g 3    COMPOUNDED NON-CONTROLLED SUBSTANCE (CMPD RX) - PHARMACY TO MIX COMPOUNDED MEDICATION Estradiol 0.01% to be compounded with testosterone 0.1% in cream. Apply pea sized amount every night at bedtime. 60 g 3    fluocinolone acetonide 0.01 % OIL PLACE 5 DROPS INTO BOTH EARS 2 TIMES DAILY. AS NEEDED  6    RANITIDINE HCL PO        Facility-Administered Encounter Medications as of 12/19/2024   Medication Dose Route Frequency Provider Last Rate Last Admin    botulinum toxin type A (BOTOX) 100 units injection 100 Units  100 Units Intramuscular Once         botulinum toxin type A (BOTOX) 100 units injection 200 Units  200 Units Intramuscular Once                  O:   NAD, WDWN, Alert & Oriented, Mood & Affect wnl, Vitals stable   General appearance normal   Vitals stable   Alert, oriented and in no acute distress     Sweating in axilla      Eyes: Conjunctivae/lids:Normal     ENT: Lips, mucosa: normal    MSK:Normal    Cardiovascular: peripheral edema none    Pulm: Breathing  Normal    Neuro/Psych: Orientation:Alert and Orientedx3 ; Mood/Affect:normal       A/P:  Axillary hyperhidrosis   Botox today   It was a pleasure speaking to Jessica Dutta today.     PROCEDURE NOTE     INDICATIONS: This patient presents with symptomatic hyperhidrosis affecting the bilateral palms. Botulinum toxin injection was indicated to treat symptomatic excess sweating. We discussed the principles of treatment, possible complications, and the need for further treatment. Informed consent was obtained. The patient then underwent the following procedure.    PROCEDURE: The patient was taken to the operative suite and properly positioned. The area to be treated was defined and confirmed by the patient and physician. The treatment area was anesthetized with ice water. The area for Botox injection was marked with at least 1 cm between injection sites. Using a concentration of 1 unit per 0.1 ml in sterile saline, (100 units to the right side and 100 units to the left side). A total of 200 units of Botox was delivered to the treatment area. The patient was discharged alert and ambulatory.

## 2025-03-27 ENCOUNTER — OFFICE VISIT (OUTPATIENT)
Dept: DERMATOLOGY | Facility: CLINIC | Age: 30
End: 2025-03-27
Payer: COMMERCIAL

## 2025-03-27 DIAGNOSIS — L70.0 ACNE VULGARIS: ICD-10-CM

## 2025-03-27 DIAGNOSIS — L74.510 AXILLARY HYPERHIDROSIS: ICD-10-CM

## 2025-03-27 DIAGNOSIS — L30.4 INTERTRIGO: ICD-10-CM

## 2025-03-27 DIAGNOSIS — L30.9 DERMATITIS: ICD-10-CM

## 2025-03-27 DIAGNOSIS — L21.9 DERMATITIS, SEBORRHEIC: ICD-10-CM

## 2025-03-27 RX ORDER — FLUOCINONIDE 0.5 MG/G
CREAM TOPICAL 2 TIMES DAILY
Qty: 120 G | Refills: 3 | Status: SHIPPED | OUTPATIENT
Start: 2025-03-27

## 2025-03-27 RX ORDER — DESONIDE 0.5 MG/G
CREAM TOPICAL
Qty: 60 G | Refills: 1 | Status: SHIPPED | OUTPATIENT
Start: 2025-03-27

## 2025-03-27 RX ORDER — KETOCONAZOLE 20 MG/G
CREAM TOPICAL
Qty: 60 G | Refills: 5 | Status: SHIPPED | OUTPATIENT
Start: 2025-03-27

## 2025-03-27 RX ORDER — SULFACETAMIDE SODIUM 100 MG/ML
LOTION TOPICAL
Qty: 118 ML | Refills: 11 | Status: SHIPPED | OUTPATIENT
Start: 2025-03-27

## 2025-03-27 NOTE — LETTER
3/27/2025      Jessica Dutta  [address] [place] [address]      Dear Colleague,    Thank you for referring your patient, Jessica Dutta, to the [facility]. Please see a copy of my visit note below.    Jessica Dutta is an extremely pleasant 29 year old year old female patient here today for axillary hyperhidrosis, acne and intertrigo and dermatitis.  Patient has no other skin complaints today.  Remainder of the HPI, Meds, PMH, Allergies, FH, and SH was reviewed in chart.      Past Medical History:   Diagnosis Date     Birth control      GERD (gastroesophageal reflux disease)      Seasonal allergies        Past Surgical History:   Procedure Laterality Date     ESOPHAGOSCOPY, GASTROSCOPY, DUODENOSCOPY (EGD), COMBINED N/A 4/8/2016    Procedure: COMBINED ESOPHAGOSCOPY, GASTROSCOPY, DUODENOSCOPY (EGD), REMOVE FOREIGN BODY;  Surgeon: Jessica Martinez MD;  Location:  GI        No family history on file.    Social History     Socioeconomic History     Marital status: Single     Spouse name: Not on file     Number of children: Not on file     Years of education: Not on file     Highest education level: Not on file   Occupational History     Not on file   Tobacco Use     Smoking status: Never     Smokeless tobacco: Never   Substance and Sexual Activity     Alcohol use: No     Drug use: Not on file     Sexual activity: Never     Birth control/protection: Pill   Other Topics Concern     Parent/sibling w/ CABG, MI or angioplasty before 65F 55M? Not Asked   Social History Narrative     Not on file     Social Drivers of Health     Financial Resource Strain: Low Risk  (7/1/2024)    Received from [facility]    Financial Resource Strain      Difficulty of Paying Living Expenses: 3      Difficulty of Paying Living Expenses: Not on file   Food Insecurity: No Food Insecurity (7/1/2024)    Received from [facility]     Food Insecurity      Do you worry your food will run out before you are able to buy more?: 1   Transportation Needs: No Transportation Needs (7/1/2024)    [facility]    Transportation Needs      Does lack of transportation keep you from medical appointments?: 1      Does lack of transportation keep you from work, meetings or getting things that you need?: 1   Physical Activity: Not on file   Stress: Not on file   Social Connections: Socially Integrated (7/1/2024)    [facility]    Social Connections      Do you often feel lonely or isolated from those around you?: 0   Interpersonal Safety: Not on file   Housing Stability: Low Risk  (7/1/2024)    Received from [facility]    Housing Stability      What is your housing situation today?: 1       Outpatient Encounter Medications as of 3/27/2025   Medication Sig Dispense Refill     Aluminum Chloride 12 % SOLN        botulinum toxin type A (BOTOX) 100 units injection        Cetirizine HCl (ZYRTEC ALLERGY PO)        clindamycin (CLEOCIN T) 1 % external lotion APPLY THIN LAYER TO FACE, CHEST, BACK,NECK, AND FLANK ONCE DAILY IN THE MORNING       COMPOUNDED NON-CONTROLLED SUBSTANCE (CMPD RX) - PHARMACY TO MIX COMPOUNDED MEDICATION Hydroquinone 6%/tretinoin 0.025%/dexamethasone 0.1% cream sig apply to face at bedtime x 4-6 months then stop 30 g 3     COMPOUNDED NON-CONTROLLED SUBSTANCE (CMPD RX) - PHARMACY TO MIX COMPOUNDED MEDICATION Estradiol 0.01% compounded into cream. Apply pea sized amount every night at bedtime. 60 g 3     COMPOUNDED NON-CONTROLLED SUBSTANCE (CMPD RX) - PHARMACY TO MIX COMPOUNDED MEDICATION Estradiol 0.01% to be compounded with testosterone 0.1% in cream. Apply pea sized amount every night at bedtime. 60 g 3     cycloSPORINE (RESTASIS) 0.05 % ophthalmic emulsion 1 drop 2 times daily       desonide (DESOWEN) 0.05 % external  cream Apply to face BID x 1-2 weeks then PRN only 60 g 1     dextromethorphan-guaiFENesin (MUCINEX DM)  MG per 12 hr tablet Take 1 tablet by mouth every 12 hours       doxepin (ZONALON) 5 % external cream APPLY TOPICALLY FOUR TIMES DAILY  5     doxepin (ZONALON) 5 % external cream Apply topically up to three times per day prn pain 45 g 3     FINACEA 15 % external gel APPLY THIN LAYER TO AFFECTED AREA ON FACE, NECK,CHEST, BACK, FLANKS, THIGHS AND BUTTOCKS AT NIGHT  11     fluocinolone acetonide 0.01 % OIL PLACE 5 DROPS INTO BOTH EARS 2 TIMES DAILY. AS NEEDED  6     fluocinonide (LIDEX) 0.05 % external cream Apply topically 2 times daily. 120 g 3     ketoconazole (NIZORAL) 2 % external cream Apply to AA on the feet, groin BID PRN 60 g 5     Lansoprazole (PREVACID PO)        levonorgestrel-ethinyl estradiol (SEASONALE) 0.15-0.03 MG per tablet Take 1 tablet by mouth daily       lidocaine (XYLOCAINE) 2 % external gel USE TOPICALLY AS NEEDED  6     loteprednol (LOTEMAX/ALREX) 0.2 % SUSP 1 drop 4 times daily       magnesium hydroxide (MILK OF MAGNESIA) 400 MG/5ML suspension        naproxen (NAPROSYN) 500 MG tablet        olopatadine (PAZEO) 0.7 % ophthalmic solution        Omega-3 Fatty Acids (OMEGA-3 FISH OIL PO)        Probiotic Product (PROBIOTIC DAILY PO)        RANITIDINE HCL PO        sulfacetamide sodium, Acne, 10 % lotion Use to face and neck  mL 11     triamcinolone (KENALOG) 0.1 % external cream Apply to arms, legs BID x 1-2 week then PRN only 80 g 2     Triamcinolone Acetonide (NASACORT ALLERGY 24HR NA)        VITAMIN D, CHOLECALCIFEROL, PO Take by mouth daily       Facility-Administered Encounter Medications as of 3/27/2025   Medication Dose Route Frequency Provider Last Rate Last Admin     botulinum toxin type A (BOTOX) 100 units injection 100 Units  100 Units Intramuscular Once          botulinum toxin type A (BOTOX) 100 units injection 200 Units  200 Units Intramuscular Once                  O:    NAD, WDWN, Alert & Oriented, Mood & Affect wnl, Vitals stable   General appearance normal   Vitals stable   Alert, oriented and in no acute distress     Sweating in axilla, face clear, ab clear      Eyes: Conjunctivae/lids:Normal     ENT: Lips, mucosa: normal    MSK:Normal    Cardiovascular: peripheral edema none    Pulm: Breathing Normal    Neuro/Psych: Orientation:Alert and Orientedx3 ; Mood/Affect:normal       A/P:  Axillary hyperhidrosis   Botox today   2. Acne cont topicals  3. Intertrigo cont topicals  4. Dermatitis clear cont topicals  It was a pleasure speaking to Jessica Dutta today.  Previous clinic notes and pertinent laboratory tests were reviewed prior to Jessica Dutta's visit.  Return to clinic 6 months  PROCEDURE NOTE     INDICATIONS: This patient presents with symptomatic hyperhidrosis affecting the bilateral palms. Botulinum toxin injection was indicated to treat symptomatic excess sweating. We discussed the principles of treatment, possible complications, and the need for further treatment. Informed consent was obtained. The patient then underwent the following procedure.    PROCEDURE: The patient was taken to the operative suite and properly positioned. The area to be treated was defined and confirmed by the patient and physician. The treatment area was anesthetized with ice water. The area for Botox injection was marked with at least 1 cm between injection sites. Using a concentration of 1 unit per 0.1 ml in sterile saline, a total of 200 injections were placed within the designated treatment area (100 units to the right side and 100 units to the left side). A total of 200 units of Botox was delivered to the treatment area. The patient was discharged alert and ambulatory.       Again, thank you for allowing me to participate in the care of your patient.        Sincerely,        Javier Callejas MD    Electronically signed

## 2025-03-27 NOTE — PROGRESS NOTES
Jessica Dutta is an extremely pleasant 29 year old year old female patient here today for axillary hyperhidrosis, acne and intertrigo and dermatitis.  Patient has no other skin complaints today.  Remainder of the HPI, Meds, PMH, Allergies, FH, and SH was reviewed in chart.      Past Medical History:   Diagnosis Date    Birth control     GERD (gastroesophageal reflux disease)     Seasonal allergies        Past Surgical History:   Procedure Laterality Date    ESOPHAGOSCOPY, GASTROSCOPY, DUODENOSCOPY (EGD), COMBINED N/A 4/8/2016    Procedure: COMBINED ESOPHAGOSCOPY, GASTROSCOPY, DUODENOSCOPY (EGD), REMOVE FOREIGN BODY;  Surgeon: Jessica Martinez MD;  Location:  GI        No family history on file.    Social History     Socioeconomic History    Marital status: Single     Spouse name: Not on file    Number of children: Not on file    Years of education: Not on file    Highest education level: Not on file   Occupational History    Not on file   Tobacco Use    Smoking status: Never    Smokeless tobacco: Never   Substance and Sexual Activity    Alcohol use: No    Drug use: Not on file    Sexual activity: Never     Birth control/protection: Pill   Other Topics Concern    Parent/sibling w/ CABG, MI or angioplasty before 65F 55M? Not Asked   Social History Narrative    Not on file     Social Drivers of Health     Financial Resource Strain: Low Risk  (7/1/2024)    [facility]    Financial Resource Strain     Difficulty of Paying Living Expenses: 3     Difficulty of Paying Living Expenses: Not on file   Food Insecurity: No Food Insecurity (7/1/2024)    [facility]    Food Insecurity     Do you worry your food will run out before you are able to buy more?: 1   Transportation Needs: No Transportation Needs (7/1/2024)    Received from [employer]    Transportation Needs     Does lack of  transportation keep you from medical appointments?: 1     Does lack of transportation keep you from work, meetings or getting things that you need?: 1   Physical Activity: Not on file   Stress: Not on file   Social Connections: Socially Integrated (7/1/2024)    [facility]    Social Connections     Do you often feel lonely or isolated from those around you?: 0   Interpersonal Safety: Not on file   Housing Stability: Low Risk  (7/1/2024)    Received from [facility]    Housing Stability     What is your housing situation today?: 1       Outpatient Encounter Medications as of 3/27/2025   Medication Sig Dispense Refill    Aluminum Chloride 12 % SOLN       botulinum toxin type A (BOTOX) 100 units injection       Cetirizine HCl (ZYRTEC ALLERGY PO)       clindamycin (CLEOCIN T) 1 % external lotion APPLY THIN LAYER TO FACE, CHEST, BACK,NECK, AND FLANK ONCE DAILY IN THE MORNING      COMPOUNDED NON-CONTROLLED SUBSTANCE (CMPD RX) - PHARMACY TO MIX COMPOUNDED MEDICATION Hydroquinone 6%/tretinoin 0.025%/dexamethasone 0.1% cream sig apply to face at bedtime x 4-6 months then stop 30 g 3    COMPOUNDED NON-CONTROLLED SUBSTANCE (CMPD RX) - PHARMACY TO MIX COMPOUNDED MEDICATION Estradiol 0.01% compounded into cream. Apply pea sized amount every night at bedtime. 60 g 3    COMPOUNDED NON-CONTROLLED SUBSTANCE (CMPD RX) - PHARMACY TO MIX COMPOUNDED MEDICATION Estradiol 0.01% to be compounded with testosterone 0.1% in cream. Apply pea sized amount every night at bedtime. 60 g 3    cycloSPORINE (RESTASIS) 0.05 % ophthalmic emulsion 1 drop 2 times daily      desonide (DESOWEN) 0.05 % external cream Apply to face BID x 1-2 weeks then PRN only 60 g 1    dextromethorphan-guaiFENesin (MUCINEX DM)  MG per 12 hr tablet Take 1 tablet by mouth every 12 hours      doxepin (ZONALON) 5 % external cream APPLY TOPICALLY FOUR TIMES DAILY  5    doxepin (ZONALON) 5 %  external cream Apply topically up to three times per day prn pain 45 g 3    FINACEA 15 % external gel APPLY THIN LAYER TO AFFECTED AREA ON FACE, NECK,CHEST, BACK, FLANKS, THIGHS AND BUTTOCKS AT NIGHT  11    fluocinolone acetonide 0.01 % OIL PLACE 5 DROPS INTO BOTH EARS 2 TIMES DAILY. AS NEEDED  6    fluocinonide (LIDEX) 0.05 % external cream Apply topically 2 times daily. 120 g 3    ketoconazole (NIZORAL) 2 % external cream Apply to AA on the feet, groin BID PRN 60 g 5    Lansoprazole (PREVACID PO)       levonorgestrel-ethinyl estradiol (SEASONALE) 0.15-0.03 MG per tablet Take 1 tablet by mouth daily      lidocaine (XYLOCAINE) 2 % external gel USE TOPICALLY AS NEEDED  6    loteprednol (LOTEMAX/ALREX) 0.2 % SUSP 1 drop 4 times daily      magnesium hydroxide (MILK OF MAGNESIA) 400 MG/5ML suspension       naproxen (NAPROSYN) 500 MG tablet       olopatadine (PAZEO) 0.7 % ophthalmic solution       Omega-3 Fatty Acids (OMEGA-3 FISH OIL PO)       Probiotic Product (PROBIOTIC DAILY PO)       RANITIDINE HCL PO       sulfacetamide sodium, Acne, 10 % lotion Use to face and neck  mL 11    triamcinolone (KENALOG) 0.1 % external cream Apply to arms, legs BID x 1-2 week then PRN only 80 g 2    Triamcinolone Acetonide (NASACORT ALLERGY 24HR NA)       VITAMIN D, CHOLECALCIFEROL, PO Take by mouth daily       Facility-Administered Encounter Medications as of 3/27/2025   Medication Dose Route Frequency Provider Last Rate Last Admin    botulinum toxin type A (BOTOX) 100 units injection 100 Units  100 Units Intramuscular Once         botulinum toxin type A (BOTOX) 100 units injection 200 Units  200 Units Intramuscular Once                  O:   NAD, WDWN, Alert & Oriented, Mood & Affect wnl, Vitals stable   General appearance normal   Vitals stable   Alert, oriented and in no acute distress     Sweating in axilla, face clear, ab clear      Eyes: Conjunctivae/lids:Normal     ENT: Lips, mucosa:  normal    MSK:Normal    Cardiovascular: peripheral edema none    Pulm: Breathing Normal    Neuro/Psych: Orientation:Alert and Orientedx3 ; Mood/Affect:normal       A/P:  Axillary hyperhidrosis   Botox today   2. Acne cont topicals  3. Intertrigo cont topicals  4. Dermatitis clear cont topicals  It was a pleasure speaking to Jessica Dutta today.  Previous clinic notes and pertinent laboratory tests were reviewed prior to Jessica Dutta's visit.  Return to clinic 6 months  PROCEDURE NOTE     INDICATIONS: This patient presents with symptomatic hyperhidrosis affecting the bilateral palms. Botulinum toxin injection was indicated to treat symptomatic excess sweating. We discussed the principles of treatment, possible complications, and the need for further treatment. Informed consent was obtained. The patient then underwent the following procedure.    PROCEDURE: The patient was taken to the operative suite and properly positioned. The area to be treated was defined and confirmed by the patient and physician. The treatment area was anesthetized with ice water. The area for Botox injection was marked with at least 1 cm between injection sites. Using a concentration of 1 unit per 0.1 ml in sterile saline, a total of 200 injections were placed within the designated treatment area (100 units to the right side and 100 units to the left side). A total of 200 units of Botox was delivered to the treatment area. The patient was discharged alert and ambulatory.

## 2025-06-11 ENCOUNTER — TELEPHONE (OUTPATIENT)
Dept: DERMATOLOGY | Facility: CLINIC | Age: 30
End: 2025-06-11
Payer: COMMERCIAL

## 2025-06-11 DIAGNOSIS — L74.510 AXILLARY HYPERHIDROSIS: Primary | ICD-10-CM

## 2025-06-11 NOTE — TELEPHONE ENCOUNTER
Requested Prescriptions   Pending Prescriptions Disp Refills    Botulinum Toxin Type A (BOTOX) 200 units injection 200 Units         There is no refill protocol information for this order         Last Written Prescription Date:  04/29/24  Last Fill Quantity: 200 units,  # refills: 0   Last office visit with prescribing provider:  4/26/24  Madalyn Mcconnell PA-C. Last injection with Dr. Callejas 3/27/25  Future Office Visit:  6/25/25 with Dr. Callejas for injection.            Jodee FLORES,  CMA

## 2025-06-25 ENCOUNTER — OFFICE VISIT (OUTPATIENT)
Dept: DERMATOLOGY | Facility: CLINIC | Age: 30
End: 2025-06-25
Payer: COMMERCIAL

## 2025-06-25 ENCOUNTER — TELEPHONE (OUTPATIENT)
Dept: DERMATOLOGY | Facility: CLINIC | Age: 30
End: 2025-06-25

## 2025-06-25 DIAGNOSIS — L74.510 AXILLARY HYPERHIDROSIS: Primary | ICD-10-CM

## 2025-06-25 DIAGNOSIS — L30.9 DERMATITIS: ICD-10-CM

## 2025-06-25 DIAGNOSIS — L74.519 FOCAL HYPERHIDROSIS: Primary | ICD-10-CM

## 2025-06-25 PROCEDURE — 64650 CHEMODENERV ECCRINE GLANDS: CPT | Performed by: DERMATOLOGY

## 2025-06-25 PROCEDURE — 99213 OFFICE O/P EST LOW 20 MIN: CPT | Mod: 25 | Performed by: DERMATOLOGY

## 2025-06-25 PROCEDURE — 99207 PR BOTOX 22-25 UNITS WRVU'S ONLY: CPT | Performed by: DERMATOLOGY

## 2025-06-25 RX ORDER — DESONIDE 0.5 MG/G
CREAM TOPICAL 2 TIMES DAILY
Qty: 60 G | Refills: 6 | Status: SHIPPED | OUTPATIENT
Start: 2025-06-25

## 2025-06-25 NOTE — TELEPHONE ENCOUNTER
This patient is coming in today. The PA should be for 200 units, 100 units in each axilla.    Deepthi Cole MA

## 2025-06-25 NOTE — ADDENDUM NOTE
Addended by: AGAPITO HAUSER on: 6/25/2025 11:32 AM     Modules accepted: Orders, Level of Service

## 2025-06-25 NOTE — LETTER
6/25/2025      Jessica Dutta  [address] [place] [address]      Dear Colleague,    Thank you for referring your patient, Jessica Dutta, to the [facility]. Please see a copy of my visit note below.    Jessica Dutta is an extremely pleasant 29 year old year old female patient here today for botox for hyperhidrosis.  Patient has no other skin complaints today.  Remainder of the HPI, Meds, PMH, Allergies, FH, and SH was reviewed in chart.       Past Medical History        Past Medical History:   Diagnosis Date     Birth control       GERD (gastroesophageal reflux disease)       Seasonal allergies              Past Surgical History         Past Surgical History:   Procedure Laterality Date     ESOPHAGOSCOPY, GASTROSCOPY, DUODENOSCOPY (EGD), COMBINED N/A 4/8/2016     Procedure: COMBINED ESOPHAGOSCOPY, GASTROSCOPY, DUODENOSCOPY (EGD), REMOVE FOREIGN BODY;  Surgeon: Jessica Martinez MD;  Location:  GI            Family History   History reviewed. No pertinent family history.        Social History   Social History            Socioeconomic History     Marital status: Single       Spouse name: Not on file     Number of children: Not on file     Years of education: Not on file     Highest education level: Not on file   Occupational History     Not on file   Tobacco Use     Smoking status: Never     Smokeless tobacco: Never   Substance and Sexual Activity     Alcohol use: No     Drug use: Not on file     Sexual activity: Never       Birth control/protection: Pill   Other Topics Concern     Parent/sibling w/ CABG, MI or angioplasty before 65F 55M? Not Asked   Social History Narrative     Not on file      Social Drivers of Health           Financial Resource Strain: Low Risk  (7/1/2024)     [facility]     Financial Resource Strain       Difficulty of Paying Living Expenses: 3       Difficulty of Paying Living Expenses: Not on file   Food  Insecurity: No Food Insecurity (7/1/2024)     Received from [facility] [employer] [facility]     Food Insecurity       Do you worry your food will run out before you are able to buy more?: 1   Transportation Needs: No Transportation Needs (7/1/2024)     Received from [facility]     Transportation Needs       Does lack of transportation keep you from medical appointments?: 1       Does lack of transportation keep you from work, meetings or getting things that you need?: 1   Physical Activity: Not on file   Stress: Not on file   Social Connections: Socially Integrated (7/1/2024)     Received from [facility] [facility]     Social Connections       Do you often feel lonely or isolated from those around you?: 0   Interpersonal Safety: Not on file   Housing Stability: Low Risk  (7/1/2024)     Received from [facility]     Housing Stability       What is your housing situation today?: 1            Encounter Medications          Outpatient Encounter Medications as of 12/19/2024   Medication Sig Dispense Refill     Aluminum Chloride 12 % SOLN           botulinum toxin type A (BOTOX) 100 units injection           Cetirizine HCl (ZYRTEC ALLERGY PO)           COMPOUNDED NON-CONTROLLED SUBSTANCE (CMPD RX) - PHARMACY TO MIX COMPOUNDED MEDICATION Hydroquinone 6%/tretinoin 0.025%/dexamethasone 0.1% cream sig apply to face at bedtime x 4-6 months then stop 30 g 3     cycloSPORINE (RESTASIS) 0.05 % ophthalmic emulsion 1 drop 2 times daily         desonide (DESOWEN) 0.05 % external cream Apply to face BID x 1-2 weeks then PRN only 60 g 1     dextromethorphan-guaiFENesin (MUCINEX DM)  MG per 12 hr tablet Take 1 tablet by mouth every 12 hours         doxepin (ZONALON) 5 % external cream APPLY TOPICALLY FOUR TIMES DAILY   5     doxepin (ZONALON) 5 % external cream Apply topically up to three times per day prn pain 45 g 3      FINACEA 15 % external gel APPLY THIN LAYER TO AFFECTED AREA ON FACE, NECK,CHEST, BACK, FLANKS, THIGHS AND BUTTOCKS AT NIGHT   11     fluocinonide (LIDEX) 0.05 % external cream Apply topically 2 times daily. 120 g 3     ketoconazole (NIZORAL) 2 % external cream Apply to AA on the feet, groin BID PRN 60 g 5     Lansoprazole (PREVACID PO)           levonorgestrel-ethinyl estradiol (SEASONALE) 0.15-0.03 MG per tablet Take 1 tablet by mouth daily         lidocaine (XYLOCAINE) 2 % external gel USE TOPICALLY AS NEEDED   6     loteprednol (LOTEMAX/ALREX) 0.2 % SUSP 1 drop 4 times daily         magnesium hydroxide (MILK OF MAGNESIA) 400 MG/5ML suspension           naproxen (NAPROSYN) 500 MG tablet           olopatadine (PAZEO) 0.7 % ophthalmic solution           Omega-3 Fatty Acids (OMEGA-3 FISH OIL PO)           Probiotic Product (PROBIOTIC DAILY PO)           sulfacetamide sodium, Acne, 10 % lotion Use to face and neck  mL 11     triamcinolone (KENALOG) 0.1 % external cream Apply to arms, legs BID x 1-2 week then PRN only 80 g 2     Triamcinolone Acetonide (NASACORT ALLERGY 24HR NA)           VITAMIN D, CHOLECALCIFEROL, PO Take by mouth daily         clindamycin (CLEOCIN T) 1 % external lotion APPLY THIN LAYER TO FACE, CHEST, BACK,NECK, AND FLANK ONCE DAILY IN THE MORNING         COMPOUNDED NON-CONTROLLED SUBSTANCE (CMPD RX) - PHARMACY TO MIX COMPOUNDED MEDICATION Estradiol 0.01% compounded into cream. Apply pea sized amount every night at bedtime. 60 g 3     COMPOUNDED NON-CONTROLLED SUBSTANCE (CMPD RX) - PHARMACY TO MIX COMPOUNDED MEDICATION Estradiol 0.01% to be compounded with testosterone 0.1% in cream. Apply pea sized amount every night at bedtime. 60 g 3     fluocinolone acetonide 0.01 % OIL PLACE 5 DROPS INTO BOTH EARS 2 TIMES DAILY. AS NEEDED   6     RANITIDINE HCL PO                      Facility-Administered Encounter Medications as of 12/19/2024   Medication Dose Route Frequency Provider Last Rate Last  Admin     botulinum toxin type A (BOTOX) 100 units injection 100 Units  100 Units Intramuscular Once           botulinum toxin type A (BOTOX) 100 units injection 200 Units  200 Units Intramuscular Once                           O:                         NAD, WDWN, Alert & Oriented, Mood & Affect wnl, Vitals stable                               General appearance normal                               Vitals stable                               Alert, oriented and in no acute distress                                 Sweating in axilla                                   Eyes: Conjunctivae/lids:Normal                                 ENT: Lips, mucosa: normal                                MSK:Normal                                Cardiovascular: peripheral edema none                                Pulm: Breathing Normal                                Neuro/Psych: Orientation:Alert and Orientedx3 ; Mood/Affect:normal         A/P:  Axillary hyperhidrosis   Botox today   It was a pleasure speaking to eJssica Dutta today.     PROCEDURE NOTE     INDICATIONS: This patient presents with symptomatic hyperhidrosis affecting the bilateral palms. Botulinum toxin injection was indicated to treat symptomatic excess sweating. We discussed the principles of treatment, possible complications, and the need for further treatment. Informed consent was obtained. The patient then underwent the following procedure.    PROCEDURE: The patient was taken to the operative suite and properly positioned. The area to be treated was defined and confirmed by the patient and physician. The treatment area was anesthetized with ice water. The area for Botox injection was marked with at least 1 cm between injection sites. Using a concentration of 1 unit per 0.1 ml in sterile saline, (100 units to the right side and 100 units to the left side). A total of 200 units of Botox was delivered to the treatment area. 30 injection sites. The patient was  discharged alert and ambulatory.     Again, thank you for allowing me to participate in the care of your patient.        Sincerely,        Javier Callejas MD    Electronically signed

## 2025-07-19 ENCOUNTER — HEALTH MAINTENANCE LETTER (OUTPATIENT)
Age: 30
End: 2025-07-19

## 2025-09-04 ENCOUNTER — OFFICE VISIT (OUTPATIENT)
Dept: DERMATOLOGY | Facility: CLINIC | Age: 30
End: 2025-09-04
Payer: COMMERCIAL

## 2025-09-04 DIAGNOSIS — L21.9 DERMATITIS, SEBORRHEIC: ICD-10-CM

## 2025-09-04 DIAGNOSIS — L74.510 AXILLARY HYPERHIDROSIS: Primary | ICD-10-CM

## 2025-09-04 DIAGNOSIS — L30.9 DERMATITIS: ICD-10-CM

## 2025-09-04 RX ORDER — KETOCONAZOLE 20 MG/ML
SHAMPOO, SUSPENSION TOPICAL DAILY PRN
Qty: 240 ML | Refills: 11 | Status: SHIPPED | OUTPATIENT
Start: 2025-09-04